# Patient Record
Sex: FEMALE | Race: WHITE | Employment: OTHER | URBAN - METROPOLITAN AREA
[De-identification: names, ages, dates, MRNs, and addresses within clinical notes are randomized per-mention and may not be internally consistent; named-entity substitution may affect disease eponyms.]

---

## 2020-10-08 RX ORDER — ATENOLOL 100 MG/1
TABLET ORAL DAILY
COMMUNITY
End: 2020-10-09

## 2020-10-08 RX ORDER — SODIUM CHLORIDE 9 MG/ML
INJECTION, SOLUTION INTRAVENOUS CONTINUOUS
Status: CANCELLED | OUTPATIENT
Start: 2020-10-14

## 2020-10-08 RX ORDER — HYDROCHLOROTHIAZIDE 50 MG/1
50 TABLET ORAL DAILY
COMMUNITY

## 2020-10-08 RX ORDER — LOSARTAN POTASSIUM 100 MG/1
100 TABLET ORAL DAILY
COMMUNITY

## 2020-10-08 SDOH — HEALTH STABILITY: MENTAL HEALTH: HOW OFTEN DO YOU HAVE A DRINK CONTAINING ALCOHOL?: NEVER

## 2020-10-09 ENCOUNTER — HOSPITAL ENCOUNTER (OUTPATIENT)
Dept: GENERAL RADIOLOGY | Age: 70
Discharge: HOME | End: 2020-10-11
Payer: MEDICARE

## 2020-10-09 ENCOUNTER — HOSPITAL ENCOUNTER (OUTPATIENT)
Dept: PREADMISSION TESTING | Age: 70
Discharge: HOME | End: 2020-10-09
Payer: MEDICARE

## 2020-10-09 ENCOUNTER — HOSPITAL ENCOUNTER (OUTPATIENT)
Age: 70
Discharge: HOME | End: 2020-10-11
Payer: MEDICARE

## 2020-10-09 VITALS
BODY MASS INDEX: 34.63 KG/M2 | HEIGHT: 58 IN | DIASTOLIC BLOOD PRESSURE: 90 MMHG | WEIGHT: 165 LBS | OXYGEN SATURATION: 96 % | HEART RATE: 90 BPM | RESPIRATION RATE: 18 BRPM | TEMPERATURE: 96.9 F | SYSTOLIC BLOOD PRESSURE: 180 MMHG

## 2020-10-09 LAB
ANION GAP SERPL CALCULATED.3IONS-SCNC: 11 MMOL/L (ref 7–16)
BUN BLDV-MCNC: 10 MG/DL (ref 8–23)
CALCIUM SERPL-MCNC: 9.1 MG/DL (ref 8.6–10.2)
CHLORIDE BLD-SCNC: 92 MMOL/L (ref 98–107)
CO2: 29 MMOL/L (ref 22–29)
CREAT SERPL-MCNC: 0.6 MG/DL (ref 0.5–1)
GFR AFRICAN AMERICAN: >60
GFR NON-AFRICAN AMERICAN: >60 ML/MIN/1.73
GLUCOSE BLD-MCNC: 86 MG/DL (ref 74–99)
HCT VFR BLD CALC: 47.2 % (ref 34–48)
HEMOGLOBIN: 15.9 G/DL (ref 11.5–15.5)
MCH RBC QN AUTO: 29.3 PG (ref 26–35)
MCHC RBC AUTO-ENTMCNC: 33.7 % (ref 32–34.5)
MCV RBC AUTO: 86.9 FL (ref 80–99.9)
PDW BLD-RTO: 13.8 FL (ref 11.5–15)
PLATELET # BLD: 354 E9/L (ref 130–450)
PMV BLD AUTO: 8.8 FL (ref 7–12)
POTASSIUM REFLEX MAGNESIUM: 4.3 MMOL/L (ref 3.5–5)
RBC # BLD: 5.43 E12/L (ref 3.5–5.5)
SODIUM BLD-SCNC: 132 MMOL/L (ref 132–146)
WBC # BLD: 10.9 E9/L (ref 4.5–11.5)

## 2020-10-09 PROCEDURE — 93005 ELECTROCARDIOGRAM TRACING: CPT | Performed by: ANESTHESIOLOGY

## 2020-10-09 PROCEDURE — 36415 COLL VENOUS BLD VENIPUNCTURE: CPT

## 2020-10-09 PROCEDURE — U0003 INFECTIOUS AGENT DETECTION BY NUCLEIC ACID (DNA OR RNA); SEVERE ACUTE RESPIRATORY SYNDROME CORONAVIRUS 2 (SARS-COV-2) (CORONAVIRUS DISEASE [COVID-19]), AMPLIFIED PROBE TECHNIQUE, MAKING USE OF HIGH THROUGHPUT TECHNOLOGIES AS DESCRIBED BY CMS-2020-01-R: HCPCS

## 2020-10-09 PROCEDURE — 80048 BASIC METABOLIC PNL TOTAL CA: CPT

## 2020-10-09 PROCEDURE — 85027 COMPLETE CBC AUTOMATED: CPT

## 2020-10-09 PROCEDURE — 71046 X-RAY EXAM CHEST 2 VIEWS: CPT

## 2020-10-09 RX ORDER — VIT C/B6/B5/MAGNESIUM/HERB 173 50-5-6-5MG
CAPSULE ORAL
COMMUNITY

## 2020-10-09 RX ORDER — IBUPROFEN 200 MG
1 CAPSULE ORAL DAILY
COMMUNITY

## 2020-10-09 RX ORDER — CETIRIZINE HYDROCHLORIDE 10 MG/1
10 TABLET ORAL DAILY
COMMUNITY

## 2020-10-09 RX ORDER — ASPIRIN 81 MG/1
81 TABLET, CHEWABLE ORAL DAILY
COMMUNITY

## 2020-10-09 RX ORDER — VITAMIN B COMPLEX
1 CAPSULE ORAL DAILY
COMMUNITY

## 2020-10-09 RX ORDER — ASCORBIC ACID 500 MG
500 TABLET ORAL DAILY
COMMUNITY

## 2020-10-09 RX ORDER — ATORVASTATIN CALCIUM 40 MG/1
40 TABLET, FILM COATED ORAL DAILY
COMMUNITY

## 2020-10-09 RX ORDER — UBIDECARENONE 75 MG
50 CAPSULE ORAL DAILY
COMMUNITY

## 2020-10-09 RX ORDER — MULTIVITAMIN WITH IRON
250 TABLET ORAL DAILY
COMMUNITY

## 2020-10-09 RX ORDER — OMEGA-3S/DHA/EPA/FISH OIL/D3 300MG-1000
400 CAPSULE ORAL DAILY
COMMUNITY

## 2020-10-09 ASSESSMENT — PAIN SCALES - GENERAL: PAINLEVEL_OUTOF10: 3

## 2020-10-09 ASSESSMENT — PAIN DESCRIPTION - ORIENTATION: ORIENTATION: LOWER

## 2020-10-09 ASSESSMENT — PAIN DESCRIPTION - PAIN TYPE: TYPE: CHRONIC PAIN

## 2020-10-09 ASSESSMENT — PAIN DESCRIPTION - DESCRIPTORS: DESCRIPTORS: DISCOMFORT;CONSTANT

## 2020-10-09 ASSESSMENT — PAIN DESCRIPTION - LOCATION: LOCATION: BACK

## 2020-10-09 ASSESSMENT — PAIN DESCRIPTION - FREQUENCY: FREQUENCY: CONTINUOUS

## 2020-10-10 LAB
EKG ATRIAL RATE: 85 BPM
EKG P AXIS: 68 DEGREES
EKG P-R INTERVAL: 162 MS
EKG Q-T INTERVAL: 376 MS
EKG QRS DURATION: 126 MS
EKG QTC CALCULATION (BAZETT): 447 MS
EKG T AXIS: -9 DEGREES
EKG VENTRICULAR RATE: 85 BPM

## 2020-10-11 LAB
SARS-COV-2: NOT DETECTED
SOURCE: NORMAL

## 2020-10-13 NOTE — PROGRESS NOTES
Called dr Ho Walsh office re :ekg, faxed ekg to dr Ho Walsh office, confirmation received.
Preliminary ekg faxed to dr Sofia Peña . .. attempt to call ofice many times in morning- consistently busy. ... able to contact office after lunch . .. they did receive fax . . will show doctor. .. Kyara Rivas 3pm  Did not hear from office yet. . preliminary ekg shown to anesthesia -- barbara escalona --- concerned about ekg  -- called dr Mare Andrade office. .. said dr Sofia Peña cancelling surgery
Reviewed medical history and  Echo form 7/2020. Will do CXR in PAT. No further orders. Pt and Dr Smith Modest office notified that H&P are needed.    Mayur Falling  10/8/2020  1200
valuables (money, credit cards, checkbooks, etc.) Do not wear any makeup (including no eye makeup) or nail polish on your fingers or toes. 11. DO NOT wear any jewelry or piercings on day of surgery. All body piercing jewelry must be removed. 12. Shower the night before surgery with ___Antibacterial soap /MERCED WIPES________    13. TOTAL JOINT REPLACEMENT/HYSTERECTOMY PATIENTS ONLY---Remember to bring Blood Bank bracelet to the hospital on the day of surgery. 14. If you have a Living Will and Durable Power of  for Healthcare, please bring in a copy. 15. If appropriate bring crutches, inspirex, WALKER, CANE etc... 12. Notify your Surgeon if you develop any illness between now and surgery time, cough, cold, fever, sore throat, nausea, vomiting, etc.  Please notify your surgeon if you experience dizziness, shortness of breath or blurred vision between now & the time of your surgery. 17. If you have ___dentures, they will be removed before going to the OR; we will provide you a container. If you wear ___contact lenses or ___glasses, they will be removed; please bring a case for them. 18. To provide excellent care visitors will be limited to 2 in the room at any given time. 19. Please bring picture ID and insurance card. 20. Sleep apnea patients need to bring CPAP AND SETTINGS to hospital on day of surgery. 21. During flu season no children under the age of 15 are permitted in the hospital for the safety of all patients. 22. Other                  Please call AMBULATORY CARE if you have any further questions.    [address]     [address]

## 2020-11-05 ENCOUNTER — HOSPITAL ENCOUNTER (OUTPATIENT)
Age: 70
Discharge: HOME | End: 2020-11-07
Payer: MEDICARE

## 2020-11-05 DIAGNOSIS — Z01.818 PREOP TESTING: ICD-10-CM

## 2020-11-05 LAB
ANION GAP SERPL CALCULATED.3IONS-SCNC: 15 MMOL/L (ref 7–16)
BUN BLDV-MCNC: 9 MG/DL (ref 8–23)
CALCIUM SERPL-MCNC: 9.6 MG/DL (ref 8.6–10.2)
CHLORIDE BLD-SCNC: 92 MMOL/L (ref 98–107)
CO2: 26 MMOL/L (ref 22–29)
CREAT SERPL-MCNC: 0.6 MG/DL (ref 0.5–1)
GFR AFRICAN AMERICAN: >60
GFR NON-AFRICAN AMERICAN: >60 ML/MIN/1.73
GLUCOSE BLD-MCNC: 131 MG/DL (ref 74–99)
HCT VFR BLD CALC: 48.6 % (ref 34–48)
HEMOGLOBIN: 15.8 G/DL (ref 11.5–15.5)
MCH RBC QN AUTO: 28.8 PG (ref 26–35)
MCHC RBC AUTO-ENTMCNC: 32.5 % (ref 32–34.5)
MCV RBC AUTO: 88.5 FL (ref 80–99.9)
PDW BLD-RTO: 14.3 FL (ref 11.5–15)
PLATELET # BLD: 410 E9/L (ref 130–450)
PMV BLD AUTO: 9.9 FL (ref 7–12)
POTASSIUM REFLEX MAGNESIUM: 4.1 MMOL/L (ref 3.5–5)
RBC # BLD: 5.49 E12/L (ref 3.5–5.5)
SODIUM BLD-SCNC: 133 MMOL/L (ref 132–146)
WBC # BLD: 11.2 E9/L (ref 4.5–11.5)

## 2020-11-05 PROCEDURE — U0003 INFECTIOUS AGENT DETECTION BY NUCLEIC ACID (DNA OR RNA); SEVERE ACUTE RESPIRATORY SYNDROME CORONAVIRUS 2 (SARS-COV-2) (CORONAVIRUS DISEASE [COVID-19]), AMPLIFIED PROBE TECHNIQUE, MAKING USE OF HIGH THROUGHPUT TECHNOLOGIES AS DESCRIBED BY CMS-2020-01-R: HCPCS

## 2020-11-06 LAB — SARS-COV-2, PCR: NOT DETECTED

## 2020-11-10 NOTE — PROGRESS NOTES
[address] [facility]                                                                                                                    PRE OP INSTRUCTIONS FOR  aKyleigh Terrazas        Date: 11/10/2020    Date of surgery: 11/11/2020   Arrival Time: Hospital will call you between 5pm and 7pm with your final arrival time for surgery    1. Do not eat or drink anything after 12 prior to surgery. This includes no water, chewing gum, mints or ice chips. 2. Take the following medications with a small sip of water on the morning of Surgery: none     3. Diabetics may take evening dose of insulin but none after midnight. If you feel symptomatic or low blood sugar morning of surgery drink 1-2 ounces of apple juice only. 4. Aspirin, Ibuprofen, Advil, Naproxen, Vitamin E and other Anti-inflammatory products should be stopped  before surgery  as directed by your physician. Take Tylenol only unless instructed otherwise by your surgeon. 5. Check with your Doctor regarding stopping Plavix, Coumadin, Lovenox, Eliquis, Effient, or other blood thinners. 6. Do not smoke,use illicit drugs and do not drink any alcoholic beverages 24 hours prior to surgery. 7. You may brush your teeth the morning of surgery. DO NOT SWALLOW WATER    8. You MUST make arrangements for a responsible adult to take you home after your surgery. You will not be allowed to leave alone or drive yourself home. It is strongly suggested someone stay with you the first 24 hrs. Your surgery will be cancelled if you do not have a ride home. 9. PEDIATRIC PATIENTS ONLY:  A parent/legal guardian must accompany a child scheduled for surgery and plan to stay at the hospital until the child is discharged. Please do not bring other children with you.     10. Please wear simple, loose fitting clothing to the hospital.  Andre Herb not bring valuables (money, credit cards, checkbooks, etc.) Do not wear any makeup (including no eye makeup) or nail

## 2020-11-11 ENCOUNTER — ANESTHESIA EVENT (OUTPATIENT)
Dept: OPERATING ROOM | Age: 70
End: 2020-11-11
Payer: MEDICARE

## 2020-11-11 ENCOUNTER — HOSPITAL ENCOUNTER (OUTPATIENT)
Age: 70
Setting detail: OUTPATIENT SURGERY
Discharge: HOME | End: 2020-11-11
Attending: ORAL & MAXILLOFACIAL SURGERY | Admitting: ORAL & MAXILLOFACIAL SURGERY
Payer: MEDICARE

## 2020-11-11 ENCOUNTER — ANESTHESIA (OUTPATIENT)
Dept: OPERATING ROOM | Age: 70
End: 2020-11-11
Payer: MEDICARE

## 2020-11-11 VITALS
OXYGEN SATURATION: 91 % | DIASTOLIC BLOOD PRESSURE: 80 MMHG | BODY MASS INDEX: 34.43 KG/M2 | HEART RATE: 87 BPM | TEMPERATURE: 97.2 F | WEIGHT: 164 LBS | RESPIRATION RATE: 16 BRPM | HEIGHT: 58 IN | SYSTOLIC BLOOD PRESSURE: 137 MMHG

## 2020-11-11 VITALS
RESPIRATION RATE: 8 BRPM | SYSTOLIC BLOOD PRESSURE: 90 MMHG | DIASTOLIC BLOOD PRESSURE: 68 MMHG | OXYGEN SATURATION: 100 %

## 2020-11-11 LAB — METER GLUCOSE: 165 MG/DL (ref 74–99)

## 2020-11-11 PROCEDURE — 3600000012 HC SURGERY LEVEL 2 ADDTL 15MIN: Performed by: ORAL & MAXILLOFACIAL SURGERY

## 2020-11-11 PROCEDURE — 7100000011 HC PHASE II RECOVERY - ADDTL 15 MIN: Performed by: ORAL & MAXILLOFACIAL SURGERY

## 2020-11-11 PROCEDURE — 2500000003 HC RX 250 WO HCPCS: Performed by: NURSE ANESTHETIST, CERTIFIED REGISTERED

## 2020-11-11 PROCEDURE — 6360000002 HC RX W HCPCS: Performed by: NURSE ANESTHETIST, CERTIFIED REGISTERED

## 2020-11-11 PROCEDURE — 94640 AIRWAY INHALATION TREATMENT: CPT

## 2020-11-11 PROCEDURE — 6370000000 HC RX 637 (ALT 250 FOR IP): Performed by: ANESTHESIOLOGY

## 2020-11-11 PROCEDURE — 2500000003 HC RX 250 WO HCPCS: Performed by: ORAL & MAXILLOFACIAL SURGERY

## 2020-11-11 PROCEDURE — 6360000002 HC RX W HCPCS: Performed by: ORAL & MAXILLOFACIAL SURGERY

## 2020-11-11 PROCEDURE — 3700000001 HC ADD 15 MINUTES (ANESTHESIA): Performed by: ORAL & MAXILLOFACIAL SURGERY

## 2020-11-11 PROCEDURE — 7100000001 HC PACU RECOVERY - ADDTL 15 MIN: Performed by: ORAL & MAXILLOFACIAL SURGERY

## 2020-11-11 PROCEDURE — 82962 GLUCOSE BLOOD TEST: CPT

## 2020-11-11 PROCEDURE — 2700000000 HC OXYGEN THERAPY PER DAY

## 2020-11-11 PROCEDURE — 7100000010 HC PHASE II RECOVERY - FIRST 15 MIN: Performed by: ORAL & MAXILLOFACIAL SURGERY

## 2020-11-11 PROCEDURE — 3600000002 HC SURGERY LEVEL 2 BASE: Performed by: ORAL & MAXILLOFACIAL SURGERY

## 2020-11-11 PROCEDURE — 3700000000 HC ANESTHESIA ATTENDED CARE: Performed by: ORAL & MAXILLOFACIAL SURGERY

## 2020-11-11 PROCEDURE — 2580000003 HC RX 258: Performed by: ANESTHESIOLOGY

## 2020-11-11 PROCEDURE — 2580000003 HC RX 258: Performed by: NURSE ANESTHETIST, CERTIFIED REGISTERED

## 2020-11-11 PROCEDURE — 7100000000 HC PACU RECOVERY - FIRST 15 MIN: Performed by: ORAL & MAXILLOFACIAL SURGERY

## 2020-11-11 RX ORDER — PROMETHAZINE HYDROCHLORIDE 25 MG/ML
6.25 INJECTION, SOLUTION INTRAMUSCULAR; INTRAVENOUS
Status: DISCONTINUED | OUTPATIENT
Start: 2020-11-11 | End: 2020-11-11 | Stop reason: HOSPADM

## 2020-11-11 RX ORDER — SODIUM CHLORIDE 9 MG/ML
INJECTION, SOLUTION INTRAVENOUS CONTINUOUS PRN
Status: DISCONTINUED | OUTPATIENT
Start: 2020-11-11 | End: 2020-11-11 | Stop reason: SDUPTHER

## 2020-11-11 RX ORDER — PROPOFOL 10 MG/ML
INJECTION, EMULSION INTRAVENOUS PRN
Status: DISCONTINUED | OUTPATIENT
Start: 2020-11-11 | End: 2020-11-11 | Stop reason: SDUPTHER

## 2020-11-11 RX ORDER — DEXAMETHASONE SODIUM PHOSPHATE 10 MG/ML
INJECTION, SOLUTION INTRAMUSCULAR; INTRAVENOUS PRN
Status: DISCONTINUED | OUTPATIENT
Start: 2020-11-11 | End: 2020-11-11 | Stop reason: SDUPTHER

## 2020-11-11 RX ORDER — HYDROCODONE BITARTRATE AND ACETAMINOPHEN 5; 325 MG/1; MG/1
1 TABLET ORAL PRN
Status: DISCONTINUED | OUTPATIENT
Start: 2020-11-11 | End: 2020-11-11 | Stop reason: HOSPADM

## 2020-11-11 RX ORDER — GLYCOPYRROLATE 0.2 MG/ML
INJECTION INTRAMUSCULAR; INTRAVENOUS PRN
Status: DISCONTINUED | OUTPATIENT
Start: 2020-11-11 | End: 2020-11-11 | Stop reason: SDUPTHER

## 2020-11-11 RX ORDER — MORPHINE SULFATE 2 MG/ML
2 INJECTION, SOLUTION INTRAMUSCULAR; INTRAVENOUS EVERY 5 MIN PRN
Status: DISCONTINUED | OUTPATIENT
Start: 2020-11-11 | End: 2020-11-11 | Stop reason: HOSPADM

## 2020-11-11 RX ORDER — MEPERIDINE HYDROCHLORIDE 25 MG/ML
12.5 INJECTION INTRAMUSCULAR; INTRAVENOUS; SUBCUTANEOUS EVERY 5 MIN PRN
Status: DISCONTINUED | OUTPATIENT
Start: 2020-11-11 | End: 2020-11-11 | Stop reason: HOSPADM

## 2020-11-11 RX ORDER — FENTANYL CITRATE 50 UG/ML
INJECTION, SOLUTION INTRAMUSCULAR; INTRAVENOUS PRN
Status: DISCONTINUED | OUTPATIENT
Start: 2020-11-11 | End: 2020-11-11 | Stop reason: SDUPTHER

## 2020-11-11 RX ORDER — LIDOCAINE HYDROCHLORIDE 20 MG/ML
INJECTION, SOLUTION INFILTRATION; PERINEURAL PRN
Status: DISCONTINUED | OUTPATIENT
Start: 2020-11-11 | End: 2020-11-11 | Stop reason: SDUPTHER

## 2020-11-11 RX ORDER — SODIUM CHLORIDE 9 MG/ML
INJECTION, SOLUTION INTRAVENOUS CONTINUOUS
Status: DISCONTINUED | OUTPATIENT
Start: 2020-11-11 | End: 2020-11-11 | Stop reason: HOSPADM

## 2020-11-11 RX ORDER — FENTANYL CITRATE 50 UG/ML
25 INJECTION, SOLUTION INTRAMUSCULAR; INTRAVENOUS EVERY 5 MIN PRN
Status: DISCONTINUED | OUTPATIENT
Start: 2020-11-11 | End: 2020-11-11 | Stop reason: HOSPADM

## 2020-11-11 RX ORDER — SODIUM CHLORIDE 0.9 % (FLUSH) 0.9 %
10 SYRINGE (ML) INJECTION PRN
Status: DISCONTINUED | OUTPATIENT
Start: 2020-11-11 | End: 2020-11-11 | Stop reason: HOSPADM

## 2020-11-11 RX ORDER — ROCURONIUM BROMIDE 10 MG/ML
INJECTION, SOLUTION INTRAVENOUS PRN
Status: DISCONTINUED | OUTPATIENT
Start: 2020-11-11 | End: 2020-11-11 | Stop reason: SDUPTHER

## 2020-11-11 RX ORDER — HYDROCODONE BITARTRATE AND ACETAMINOPHEN 5; 325 MG/1; MG/1
2 TABLET ORAL PRN
Status: DISCONTINUED | OUTPATIENT
Start: 2020-11-11 | End: 2020-11-11 | Stop reason: HOSPADM

## 2020-11-11 RX ORDER — IPRATROPIUM BROMIDE AND ALBUTEROL SULFATE 2.5; .5 MG/3ML; MG/3ML
1 SOLUTION RESPIRATORY (INHALATION) ONCE
Status: COMPLETED | OUTPATIENT
Start: 2020-11-11 | End: 2020-11-11

## 2020-11-11 RX ORDER — SODIUM CHLORIDE 0.9 % (FLUSH) 0.9 %
10 SYRINGE (ML) INJECTION EVERY 12 HOURS SCHEDULED
Status: DISCONTINUED | OUTPATIENT
Start: 2020-11-11 | End: 2020-11-11 | Stop reason: HOSPADM

## 2020-11-11 RX ORDER — LIDOCAINE HYDROCHLORIDE AND EPINEPHRINE 10; 10 MG/ML; UG/ML
INJECTION, SOLUTION INFILTRATION; PERINEURAL PRN
Status: DISCONTINUED | OUTPATIENT
Start: 2020-11-11 | End: 2020-11-11 | Stop reason: ALTCHOICE

## 2020-11-11 RX ORDER — ONDANSETRON 2 MG/ML
INJECTION INTRAMUSCULAR; INTRAVENOUS PRN
Status: DISCONTINUED | OUTPATIENT
Start: 2020-11-11 | End: 2020-11-11 | Stop reason: SDUPTHER

## 2020-11-11 RX ADMIN — PROPOFOL 150 MG: 10 INJECTION, EMULSION INTRAVENOUS at 09:27

## 2020-11-11 RX ADMIN — IPRATROPIUM BROMIDE AND ALBUTEROL SULFATE 1 AMPULE: .5; 3 SOLUTION RESPIRATORY (INHALATION) at 10:52

## 2020-11-11 RX ADMIN — ROCURONIUM BROMIDE 40 MG: 10 INJECTION, SOLUTION INTRAVENOUS at 09:26

## 2020-11-11 RX ADMIN — Medication 2 G: at 09:18

## 2020-11-11 RX ADMIN — SODIUM CHLORIDE: 9 INJECTION, SOLUTION INTRAVENOUS at 08:08

## 2020-11-11 RX ADMIN — LIDOCAINE HYDROCHLORIDE 50 MG: 20 INJECTION, SOLUTION INFILTRATION; PERINEURAL at 09:27

## 2020-11-11 RX ADMIN — FENTANYL CITRATE 100 MCG: 50 INJECTION, SOLUTION INTRAMUSCULAR; INTRAVENOUS at 09:18

## 2020-11-11 RX ADMIN — ONDANSETRON 4 MG: 2 INJECTION INTRAMUSCULAR; INTRAVENOUS at 09:39

## 2020-11-11 RX ADMIN — NEOSTIGMINE METHYLSULFATE 3 MG: 1 INJECTION INTRAMUSCULAR; INTRAVENOUS; SUBCUTANEOUS at 09:55

## 2020-11-11 RX ADMIN — PHENYLEPHRINE HYDROCHLORIDE 100 MCG: 10 INJECTION INTRAVENOUS at 09:28

## 2020-11-11 RX ADMIN — GLYCOPYRROLATE 0.6 MG: 0.2 INJECTION, SOLUTION INTRAMUSCULAR; INTRAVENOUS at 09:55

## 2020-11-11 RX ADMIN — SODIUM CHLORIDE: 9 INJECTION, SOLUTION INTRAVENOUS at 08:30

## 2020-11-11 RX ADMIN — DEXAMETHASONE SODIUM PHOSPHATE 10 MG: 10 INJECTION, SOLUTION INTRAMUSCULAR; INTRAVENOUS at 09:39

## 2020-11-11 RX ADMIN — PHENYLEPHRINE HYDROCHLORIDE 100 MCG: 10 INJECTION INTRAVENOUS at 09:31

## 2020-11-11 ASSESSMENT — PULMONARY FUNCTION TESTS
PIF_VALUE: 19
PIF_VALUE: 19
PIF_VALUE: 17
PIF_VALUE: 19
PIF_VALUE: 15
PIF_VALUE: 19
PIF_VALUE: 29
PIF_VALUE: 15
PIF_VALUE: 24
PIF_VALUE: 18
PIF_VALUE: 1
PIF_VALUE: 18
PIF_VALUE: 19
PIF_VALUE: 0
PIF_VALUE: 2
PIF_VALUE: 19
PIF_VALUE: 18
PIF_VALUE: 17
PIF_VALUE: 15
PIF_VALUE: 19
PIF_VALUE: 14
PIF_VALUE: 19
PIF_VALUE: 0
PIF_VALUE: 18
PIF_VALUE: 19
PIF_VALUE: 19
PIF_VALUE: 0
PIF_VALUE: 2
PIF_VALUE: 2
PIF_VALUE: 19
PIF_VALUE: 0
PIF_VALUE: 16
PIF_VALUE: 20
PIF_VALUE: 0
PIF_VALUE: 15
PIF_VALUE: 19

## 2020-11-11 ASSESSMENT — PAIN DESCRIPTION - PAIN TYPE
TYPE: SURGICAL PAIN
TYPE: SURGICAL PAIN

## 2020-11-11 ASSESSMENT — PAIN DESCRIPTION - DESCRIPTORS
DESCRIPTORS: DISCOMFORT;SORE
DESCRIPTORS: DISCOMFORT;ACHING

## 2020-11-11 ASSESSMENT — PAIN DESCRIPTION - FREQUENCY: FREQUENCY: CONTINUOUS

## 2020-11-11 ASSESSMENT — PAIN DESCRIPTION - ONSET: ONSET: ON-GOING

## 2020-11-11 ASSESSMENT — PAIN DESCRIPTION - LOCATION
LOCATION: MOUTH
LOCATION: MOUTH

## 2020-11-11 ASSESSMENT — PAIN SCALES - GENERAL
PAINLEVEL_OUTOF10: 2
PAINLEVEL_OUTOF10: 0
PAINLEVEL_OUTOF10: 2

## 2020-11-11 ASSESSMENT — LIFESTYLE VARIABLES: SMOKING_STATUS: 1

## 2020-11-11 ASSESSMENT — PAIN - FUNCTIONAL ASSESSMENT: PAIN_FUNCTIONAL_ASSESSMENT: 0-10

## 2020-11-11 NOTE — ANESTHESIA PRE PROCEDURE
Department of Anesthesiology  Preprocedure Note       Name:  Marlyn Mckeon   Age:  65 y.o.  :  1950                                          MRN:  [id number]         Date:  2020      Surgeon: Kandy Carlson):  Negrita Barrera DDS    Procedure: Procedure(s):  EXTRACTION OF REMAINING SEPTIC TEETH    Medications prior to admission:   Prior to Admission medications    Medication Sig Start Date End Date Taking?  Authorizing Provider   Omega-3 Fatty Acids (OMEGA-3 EPA FISH OIL PO) Take by mouth   Yes Historical Provider, MD   atorvastatin (LIPITOR) 40 MG tablet Take 40 mg by mouth daily   Yes Historical Provider, MD   Ferrous Sulfate (IRON PO) Take 45 mg by mouth 3 times a week   Yes Historical Provider, MD   magnesium (MAGNESIUM-OXIDE) 250 MG TABS tablet Take 250 mg by mouth daily   Yes Historical Provider, MD   vitamin C (ASCORBIC ACID) 500 MG tablet Take 500 mg by mouth daily   Yes Historical Provider, MD   vitamin D3 (CHOLECALCIFEROL) 10 MCG (400 UNIT) TABS tablet Take 400 Units by mouth daily   Yes Historical Provider, MD   Misc Natural Products (Winda Rist COMPLEX/MSM PO) Take by mouth   Yes Historical Provider, MD   Turmeric 500 MG CAPS Take by mouth   Yes Historical Provider, MD   aspirin 81 MG chewable tablet Take 81 mg by mouth daily   Yes Historical Provider, MD   cetirizine (ZYRTEC) 10 MG tablet Take 10 mg by mouth daily   Yes Historical Provider, MD   b complex vitamins capsule Take 1 capsule by mouth daily   Yes Historical Provider, MD   vitamin B-12 (CYANOCOBALAMIN) 100 MCG tablet Take 50 mcg by mouth daily   Yes Historical Provider, MD   calcium carbonate (OYSTER SHELL CALCIUM 500 MG) 1250 (500 Ca) MG tablet Take 1 tablet by mouth daily   Yes Historical Provider, MD   losartan (COZAAR) 100 MG tablet Take 100 mg by mouth daily   Yes Historical Provider, MD   Lansoprazole (PREVACID PO) Take by mouth   Yes Historical Provider, MD   hydroCHLOROthiazide (HYDRODIURIL) 50 MG tablet Take 50 mg by mouth daily   Yes Historical Provider, MD   metFORMIN (GLUCOPHAGE) 500 MG tablet Take 500 mg by mouth 2 times daily (with meals) 2 tabs in am, 1 tab with supper   Yes Historical Provider, MD       Current medications:    Current Facility-Administered Medications   Medication Dose Route Frequency Provider Last Rate Last Dose    sodium chloride flush 0.9 % injection 10 mL  10 mL Intravenous 2 times per day Loren Estrella, DDS        sodium chloride flush 0.9 % injection 10 mL  10 mL Intravenous PRN Loren Estrella, DDS        ceFAZolin (ANCEF) 2 g in sterile water 20 mL IV syringe  2 g Intravenous On Call to OR Lorenijeoma De La Rosa, DDS        0.9 % sodium chloride infusion   Intravenous Continuous Kyree Agrawal  mL/hr at 11/11/20 0808         Allergies: Allergies   Allergen Reactions    Cortisone     Novocain [Procaine]     Other      Pollens, an antibiotic -does not know name of     Tylenol [Acetaminophen]      Can take regular tylenol but not extra strengh       Problem List:  There is no problem list on file for this patient.       Past Medical History:        Diagnosis Date    Arthritis     [facility] [address]     Diabetes mellitus [facility] [address]     Fibromyalgia     Heart valve disorder     Hypertension        Past Surgical History:        Procedure Laterality Date    APPENDECTOMY      CHOLECYSTECTOMY      HERNIA REPAIR      LAPAROSCOPY         Social History:    Social History     Tobacco Use    Smoking status: Current Every Day Smoker     Packs/day: 2.00    Smokeless tobacco: Never Used   Substance Use Topics    Alcohol use: Not Currently     Frequency: Never                                Ready to quit: Not Answered  Counseling given: Not Answered      Vital Signs (Current):   Vitals:    11/10/20 1001 11/11/20 0747   BP:  130/89   Pulse:  (!) 5   Resp:  20   Temp:  97.7 °F (36.5 °C)   TempSrc:  Temporal   SpO2:  92%   Weight: 160 lb (72.6 kg) 164 lb (74.4 kg)   Height: 4' 10\" (1.473 m) 4' 10\" (1.473 m)                                              BP Readings from Last 3 Encounters:   11/11/20 130/89   10/09/20 (!) 180/90       NPO Status:                                                                                 BMI:   Wt Readings from Last 3 Encounters:   11/11/20 164 lb (74.4 kg)   10/09/20 165 lb (74.8 kg)     Body mass index is 34.28 kg/m². CBC:   Lab Results   Component Value Date    WBC 11.2 11/05/2020    RBC 5.49 11/05/2020    HGB 15.8 11/05/2020    HCT 48.6 11/05/2020    MCV 88.5 11/05/2020    RDW 14.3 11/05/2020     11/05/2020       CMP:   Lab Results   Component Value Date     11/05/2020    K 4.1 11/05/2020    CL 92 11/05/2020    CO2 26 11/05/2020    BUN 9 11/05/2020    CREATININE 0.6 11/05/2020    GFRAA >60 11/05/2020    LABGLOM >60 11/05/2020    GLUCOSE 131 11/05/2020    CALCIUM 9.6 11/05/2020       POC Tests: No results for input(s): POCGLU, POCNA, POCK, POCCL, POCBUN, POCHEMO, POCHCT in the last 72 hours.     Coags: No results found for: PROTIME, INR, APTT    HCG (If Applicable): No results found for: PREGTESTUR, PREGSERUM, HCG, HCGQUANT     ABGs: No results found for: PHART, PO2ART, WOK4OZL, LXV2IQO, BEART, A6CJXUHX     Type & Screen (If Applicable):  No results found for: LABABO, LABRH    Drug/Infectious Status (If Applicable):  No results found for: HIV, HEPCAB    COVID-19 Screening (If Applicable):   Lab Results   Component Value Date    COVID19 Not Detected 11/05/2020         Anesthesia Evaluation  Patient summary reviewed no history of anesthetic complications:   Airway: Mallampati: II        Dental:      Comment: Poor repair    Pulmonary: breath sounds clear to auscultation  (+) COPD:  current smoker                           Cardiovascular:    (+) hypertension:, hyperlipidemia        Rhythm: regular  Rate: normal                    Neuro/Psych:   (+) neuromuscular disease (fibromyalgia):,             GI/Hepatic/Renal: Neg GI/Hepatic/Renal ROS       (-) no morbid obesity       Endo/Other:    (+) Diabetes, . Abdominal:   (+) obese,         Vascular: negative vascular ROS. Anesthesia Plan      general     ASA 3       Induction: intravenous. MIPS: Postoperative opioids intended and Prophylactic antiemetics administered. Anesthetic plan and risks discussed with patient. Plan discussed with CRNA. DOS STAFF ADDENDUM:    Pt seen and examined, chart reviewed (including anesthesia, drug and allergy history). Anesthetic plan, risks, benefits, alternatives, and personnel involved discussed with patient. Patient verbalized an understanding and agrees to proceed. Plan discussed with care team members and agreed upon.     Maximilian Wright MD  Staff Anesthesiologist  8:56 AM        Maximilian Wright MD   11/11/2020

## 2020-11-11 NOTE — OP NOTE
removed. The  patient was awoke, extubated and transported to the recovery room in  stable condition.         Michele Richardson    D: 11/11/2020 10:29:51       T: 11/11/2020 11:44:40     [email or web address]  Job#: [id number]     Doc#: [id number]    CC:

## 2020-11-11 NOTE — BRIEF OP NOTE
Brief Postoperative Note      Patient: Domingo Magaña  YOB: 1950  MRN: [id number]    Date of Procedure: 11/11/2020    Pre-Op Diagnosis: SEPTIC TEETH    Post-Op Diagnosis: Same       Procedure(s):  EXTRACTION OF REMAINING SEPTIC TEETH    Surgeon(s):  Catrachita Ott DDS    Assistant:  * No surgical staff found *    Anesthesia: General    Estimated Blood Loss (mL): Minimal    Complications: None    Specimens:   * No specimens in log *    Implants:  * No implants in log *      Drains: * No LDAs found *    Findings: multiple septic teeth    Electronically signed by Catrachita Ott DDS on 11/11/2020 at 10:05 AM

## 2023-04-04 DIAGNOSIS — E78.5 HYPERLIPIDEMIA, UNSPECIFIED HYPERLIPIDEMIA TYPE: Primary | ICD-10-CM

## 2023-04-07 RX ORDER — ATORVASTATIN CALCIUM 40 MG/1
TABLET, FILM COATED ORAL
Qty: 90 TABLET | Refills: 3 | Status: SHIPPED | OUTPATIENT
Start: 2023-04-07 | End: 2023-04-12 | Stop reason: SDUPTHER

## 2023-04-12 DIAGNOSIS — E78.5 HYPERLIPIDEMIA, UNSPECIFIED HYPERLIPIDEMIA TYPE: ICD-10-CM

## 2023-04-17 RX ORDER — ATORVASTATIN CALCIUM 40 MG/1
40 TABLET, FILM COATED ORAL NIGHTLY
Qty: 90 TABLET | Refills: 3 | Status: SHIPPED | OUTPATIENT
Start: 2023-04-17 | End: 2024-03-25 | Stop reason: SDUPTHER

## 2023-05-01 DIAGNOSIS — K21.9 GASTROESOPHAGEAL REFLUX DISEASE, UNSPECIFIED WHETHER ESOPHAGITIS PRESENT: Primary | ICD-10-CM

## 2023-05-01 RX ORDER — LANSOPRAZOLE 30 MG/1
30 CAPSULE, DELAYED RELEASE ORAL
Qty: 30 CAPSULE | Refills: 1 | Status: SHIPPED | OUTPATIENT
Start: 2023-05-01 | End: 2023-05-04 | Stop reason: SDUPTHER

## 2023-05-04 ENCOUNTER — OFFICE VISIT (OUTPATIENT)
Dept: PRIMARY CARE | Facility: CLINIC | Age: 73
End: 2023-05-04
Payer: MEDICARE

## 2023-05-04 VITALS
RESPIRATION RATE: 18 BRPM | WEIGHT: 164 LBS | DIASTOLIC BLOOD PRESSURE: 86 MMHG | SYSTOLIC BLOOD PRESSURE: 128 MMHG | TEMPERATURE: 97.2 F | BODY MASS INDEX: 30.96 KG/M2 | OXYGEN SATURATION: 95 % | HEIGHT: 61 IN | HEART RATE: 93 BPM

## 2023-05-04 DIAGNOSIS — D75.1 POLYCYTHEMIA: ICD-10-CM

## 2023-05-04 DIAGNOSIS — E87.1 HYPONATREMIA: ICD-10-CM

## 2023-05-04 DIAGNOSIS — I10 PRIMARY HYPERTENSION: ICD-10-CM

## 2023-05-04 DIAGNOSIS — E78.5 HYPERLIPIDEMIA, UNSPECIFIED HYPERLIPIDEMIA TYPE: ICD-10-CM

## 2023-05-04 DIAGNOSIS — J30.89 ALLERGIC RHINITIS DUE TO OTHER ALLERGIC TRIGGER, UNSPECIFIED SEASONALITY: ICD-10-CM

## 2023-05-04 DIAGNOSIS — E11.49 CONTROLLED TYPE 2 DIABETES MELLITUS WITH OTHER NEUROLOGIC COMPLICATION, WITHOUT LONG-TERM CURRENT USE OF INSULIN (MULTI): ICD-10-CM

## 2023-05-04 DIAGNOSIS — F17.209 NICOTINE DEPENDENCE WITH NICOTINE-INDUCED DISORDER, UNSPECIFIED NICOTINE PRODUCT TYPE: ICD-10-CM

## 2023-05-04 DIAGNOSIS — J44.9 CHRONIC OBSTRUCTIVE PULMONARY DISEASE, UNSPECIFIED COPD TYPE (MULTI): ICD-10-CM

## 2023-05-04 DIAGNOSIS — K21.9 GASTROESOPHAGEAL REFLUX DISEASE, UNSPECIFIED WHETHER ESOPHAGITIS PRESENT: Primary | ICD-10-CM

## 2023-05-04 DIAGNOSIS — E61.1 IRON DEFICIENCY: ICD-10-CM

## 2023-05-04 DIAGNOSIS — I35.0 NONRHEUMATIC AORTIC VALVE STENOSIS: ICD-10-CM

## 2023-05-04 DIAGNOSIS — Z00.00 HEALTH CARE MAINTENANCE: ICD-10-CM

## 2023-05-04 PROBLEM — R60.0 BILATERAL LEG EDEMA: Status: ACTIVE | Noted: 2023-05-04

## 2023-05-04 PROBLEM — I25.10 CORONARY ARTERY CALCIFICATION SEEN ON CAT SCAN: Status: ACTIVE | Noted: 2023-05-04

## 2023-05-04 PROBLEM — R10.32 ABDOMINAL PAIN, ACUTE, LEFT LOWER QUADRANT: Status: RESOLVED | Noted: 2023-05-04 | Resolved: 2023-05-04

## 2023-05-04 PROBLEM — M25.422 EFFUSION OF LEFT ELBOW: Status: RESOLVED | Noted: 2023-05-04 | Resolved: 2023-05-04

## 2023-05-04 PROBLEM — J30.9 ALLERGIC RHINITIS: Status: ACTIVE | Noted: 2023-05-04

## 2023-05-04 PROBLEM — I73.9 CLAUDICATION (CMS-HCC): Status: ACTIVE | Noted: 2023-05-04

## 2023-05-04 PROBLEM — M25.552 LEFT HIP PAIN: Status: RESOLVED | Noted: 2023-05-04 | Resolved: 2023-05-04

## 2023-05-04 PROBLEM — M25.522 LEFT ELBOW PAIN: Status: RESOLVED | Noted: 2023-05-04 | Resolved: 2023-05-04

## 2023-05-04 PROBLEM — M15.9 GENERALIZED OSTEOARTHRITIS OF MULTIPLE SITES: Status: ACTIVE | Noted: 2023-05-04

## 2023-05-04 PROBLEM — R10.84 ABDOMINAL DISCOMFORT, GENERALIZED: Status: RESOLVED | Noted: 2023-05-04 | Resolved: 2023-05-04

## 2023-05-04 PROBLEM — G47.33 MODERATE OBSTRUCTIVE SLEEP APNEA: Status: ACTIVE | Noted: 2023-05-04

## 2023-05-04 PROBLEM — R10.9 ABDOMINAL PAIN: Status: RESOLVED | Noted: 2023-05-04 | Resolved: 2023-05-04

## 2023-05-04 PROBLEM — M79.7 FIBROMYALGIA: Status: ACTIVE | Noted: 2023-05-04

## 2023-05-04 PROBLEM — F41.9 ANXIETY: Status: RESOLVED | Noted: 2023-05-04 | Resolved: 2023-05-04

## 2023-05-04 PROBLEM — E11.9 TYPE 2 DIABETES MELLITUS WITHOUT COMPLICATION, WITHOUT LONG-TERM CURRENT USE OF INSULIN (MULTI): Status: ACTIVE | Noted: 2023-05-04

## 2023-05-04 PROBLEM — F17.200 NICOTINE DEPENDENCE: Status: ACTIVE | Noted: 2023-05-04

## 2023-05-04 PROBLEM — D72.829 LEUKOCYTOSIS: Status: RESOLVED | Noted: 2023-05-04 | Resolved: 2023-05-04

## 2023-05-04 PROBLEM — R94.31 T WAVE INVERSION IN EKG: Status: RESOLVED | Noted: 2023-05-04 | Resolved: 2023-05-04

## 2023-05-04 PROBLEM — G62.9 PERIPHERAL NEUROPATHY: Status: ACTIVE | Noted: 2023-05-04

## 2023-05-04 PROBLEM — L30.9 ECZEMA: Status: ACTIVE | Noted: 2023-05-04

## 2023-05-04 PROBLEM — I73.9 CLAUDICATION (CMS-HCC): Status: RESOLVED | Noted: 2023-05-04 | Resolved: 2023-05-04

## 2023-05-04 PROBLEM — H10.45 CHRONIC ALLERGIC CONJUNCTIVITIS: Status: ACTIVE | Noted: 2023-05-04

## 2023-05-04 PROBLEM — K04.7 DENTAL INFECTION: Status: RESOLVED | Noted: 2023-05-04 | Resolved: 2023-05-04

## 2023-05-04 PROBLEM — K44.9 HIATAL HERNIA: Status: ACTIVE | Noted: 2023-05-04

## 2023-05-04 PROBLEM — K58.0 IRRITABLE BOWEL SYNDROME WITH DIARRHEA: Status: ACTIVE | Noted: 2023-05-04

## 2023-05-04 PROBLEM — D58.2 ELEVATED HEMOGLOBIN (CMS-HCC): Status: ACTIVE | Noted: 2023-05-04

## 2023-05-04 PROBLEM — I45.10 RIGHT BUNDLE BRANCH BLOCK (RBBB) ON ELECTROCARDIOGRAPHY: Status: ACTIVE | Noted: 2023-05-04

## 2023-05-04 PROBLEM — M62.838 TRAPEZIUS MUSCLE SPASM: Status: RESOLVED | Noted: 2023-05-04 | Resolved: 2023-05-04

## 2023-05-04 PROBLEM — F41.8 DEPRESSION WITH ANXIETY: Status: ACTIVE | Noted: 2023-05-04

## 2023-05-04 PROBLEM — L72.3 SEBACEOUS CYST: Status: RESOLVED | Noted: 2023-05-04 | Resolved: 2023-05-04

## 2023-05-04 PROBLEM — F41.9 ANXIETY: Status: ACTIVE | Noted: 2023-05-04

## 2023-05-04 PROCEDURE — 80061 LIPID PANEL: CPT

## 2023-05-04 PROCEDURE — 1160F RVW MEDS BY RX/DR IN RCRD: CPT | Performed by: INTERNAL MEDICINE

## 2023-05-04 PROCEDURE — 3044F HG A1C LEVEL LT 7.0%: CPT | Performed by: INTERNAL MEDICINE

## 2023-05-04 PROCEDURE — 83036 HEMOGLOBIN GLYCOSYLATED A1C: CPT

## 2023-05-04 PROCEDURE — 85025 COMPLETE CBC W/AUTO DIFF WBC: CPT

## 2023-05-04 PROCEDURE — 4010F ACE/ARB THERAPY RXD/TAKEN: CPT | Performed by: INTERNAL MEDICINE

## 2023-05-04 PROCEDURE — 80048 BASIC METABOLIC PNL TOTAL CA: CPT

## 2023-05-04 PROCEDURE — 1159F MED LIST DOCD IN RCRD: CPT | Performed by: INTERNAL MEDICINE

## 2023-05-04 PROCEDURE — 82668 ASSAY OF ERYTHROPOIETIN: CPT

## 2023-05-04 PROCEDURE — 3079F DIAST BP 80-89 MM HG: CPT | Performed by: INTERNAL MEDICINE

## 2023-05-04 PROCEDURE — 3074F SYST BP LT 130 MM HG: CPT | Performed by: INTERNAL MEDICINE

## 2023-05-04 PROCEDURE — 99214 OFFICE O/P EST MOD 30 MIN: CPT | Performed by: INTERNAL MEDICINE

## 2023-05-04 RX ORDER — FLUTICASONE PROPIONATE 50 MCG
1-2 SPRAY, SUSPENSION (ML) NASAL DAILY
COMMUNITY
End: 2024-01-14

## 2023-05-04 RX ORDER — EZETIMIBE 10 MG/1
TABLET ORAL
COMMUNITY
Start: 2007-02-28 | End: 2023-05-04 | Stop reason: ALTCHOICE

## 2023-05-04 RX ORDER — NAPROXEN SODIUM 220 MG/1
81 TABLET, FILM COATED ORAL DAILY
COMMUNITY

## 2023-05-04 RX ORDER — METFORMIN HYDROCHLORIDE 500 MG/1
TABLET, EXTENDED RELEASE ORAL
Qty: 270 TABLET | Refills: 3 | Status: SHIPPED | OUTPATIENT
Start: 2023-05-04 | End: 2024-03-25 | Stop reason: SDUPTHER

## 2023-05-04 RX ORDER — LOSARTAN POTASSIUM 100 MG/1
1 TABLET ORAL DAILY
COMMUNITY
Start: 2020-09-21 | End: 2023-12-10

## 2023-05-04 RX ORDER — LANSOPRAZOLE 30 MG/1
30 CAPSULE, DELAYED RELEASE ORAL
Qty: 90 CAPSULE | Refills: 3 | Status: SHIPPED | OUTPATIENT
Start: 2023-05-04 | End: 2024-01-21

## 2023-05-04 RX ORDER — AMLODIPINE BESYLATE 10 MG/1
1 TABLET ORAL DAILY
COMMUNITY
Start: 2020-10-13 | End: 2023-07-12 | Stop reason: SDUPTHER

## 2023-05-04 RX ORDER — FLUTICASONE PROPIONATE AND SALMETEROL 50; 250 UG/1; UG/1
1 POWDER RESPIRATORY (INHALATION) 2 TIMES DAILY
COMMUNITY
Start: 2022-11-15 | End: 2023-05-04

## 2023-05-04 RX ORDER — HYDROCHLOROTHIAZIDE 50 MG/1
1 TABLET ORAL DAILY
COMMUNITY
Start: 2020-09-21 | End: 2024-01-14

## 2023-05-04 RX ORDER — CYANOCOBALAMIN (VITAMIN B-12) 500 MCG
400 TABLET ORAL DAILY
COMMUNITY

## 2023-05-04 RX ORDER — METFORMIN HYDROCHLORIDE 500 MG/1
2 TABLET, EXTENDED RELEASE ORAL 2 TIMES DAILY
COMMUNITY
Start: 2022-05-12 | End: 2023-05-04 | Stop reason: SDUPTHER

## 2023-05-04 NOTE — ASSESSMENT & PLAN NOTE
- FEV1 52-54% based on PFT 7/2020 (GOLD 2)  - c/w pro air   - patient is technically group B for GOLD, low risk of exacerbation but high symptomatic, technically treatment is etiher LABA or LAMA, however, have trial of prescribed either LABA or LAMA in the past, patient didn't  any of it because of the cost, s/p trial of Breztri and Stiolto, Trelegy works but expensive  - will observe for now, can consider other controller at next visit

## 2023-05-04 NOTE — ASSESSMENT & PLAN NOTE
- low sats but otherwise iron level is good  - have improved  - c/w iron supplements  Lab Results   Component Value Date    FERRITIN 29 02/01/2023     Lab Results   Component Value Date    IRON 89 02/01/2023    TIBC 392 02/01/2023    FERRITIN 29 02/01/2023

## 2023-05-04 NOTE — ASSESSMENT & PLAN NOTE
>>ASSESSMENT AND PLAN FOR ELEVATED HEMOGLOBIN (CMS/HCC) WRITTEN ON 5/4/2023  7:34 PM BY CHEMA IRELAND MD    - patient have elevated Hgb, mild but stable, it is possible that this is reactive given her epo is elevated/normal  - negative JAK2 mutation  - have JAKOB based on sleep studies - however declined to be on CPAP right now - doesn't want to drive far to get it, and also declined pulm referral  - seen heme/onc, and received phlebotomy once, unsure how it would help if the polycythemia is secondary to body's own stimulation, as mild polycythemia is clinically insignificant  - f/u CBC as above  - will repeat epo as below

## 2023-05-04 NOTE — ASSESSMENT & PLAN NOTE
- mild as stated on last echo 3/2022  - advised to follow up with cardiologist   - will repeat in 2 years given mild

## 2023-05-04 NOTE — ASSESSMENT & PLAN NOTE
- c/w metformin 1000 mg qAM and 500 mg qPM  - f/u A1C   - UACR noted negative 2/1/2023  - unremarkable monofilament test on 11/14/22  - advised to see opthalmology yearly

## 2023-05-04 NOTE — PROGRESS NOTES
Subjective   Patient ID:   Aisha Alcantar is a 72 y.o. female, history of fibromyalgia, COPD, DM II, HLD, current smoker, iron deficiency, GERD,  who presents for Follow-up (3month )    COPD  - last PFT 7/2020, moderate COPD  - on PRN proair, 1-2x a month  - s/p trial of Stiolto andBresebtri   - Trelegy was helpful however unable to afford    mild aortic stenosis   - last echo in 3/2022, noted mild AS  - seen cardiology on 9/2022, normal stress test on 10/2022    DM II  - on metformin 1000 mg qAm and 500 mg qPM  - last A1C was 6.5% on 8/2022    HTN  - on losartan 100 mg qd  - on HCTZ 50mg qd  - on amlodipine 10 mg qd     HLD  - on atorvastatin 40 mg qd     Allergic rhinitis  - cetirizine 10 mg qd    GERD  - lansoprazole 30 mg qd     Iron Deficiency  - take ferrous sulfate 3 x a week  - last saturations was 17% on 8/2022    Current smoker  - tried patch, hypnosis, medication, none works    Back pain  - have it for years, no trauma  - patient with herniated disc, patient also with herniated disc in the neck, and also hip pain, occasionally patient knees goes out  - doesn't always take Tylenol, take 650 mg up to BID and Advil  - no new symptoms in bowel/bladder incontinence, patient occasionally have urinary incontinence, and occasionally have bowel incontinence  - numbness on bilateral feet and occasional hands, no saddle anesthesia     Osteopenia  - on calcium 600 mg BID  - on vitamin D 5000 unit once daily     Polycythemia  - seen heme/onc on 1/2022  - noted likely secondary cause due to smoking  - s/p phlebotomy x1, felt worse after, last seen heme onc was 7/2022  - epo normal , likely secondary to tobacco use     All other (10) organ systems have been reviewed, negative for significant complaint and no change from baseline other than mentioned as per HPI above.    Patient Active Problem List   Diagnosis    Hiatal hernia    Polycythemia    Benign essential hypertension    Hyperlipidemia    Allergic rhinitis     Aortic stenosis    Bilateral leg edema    Chronic allergic conjunctivitis    Chronic obstructive pulmonary disease (CMS/HCC)    Controlled diabetes mellitus with neurologic complication (CMS/HCC)    Coronary artery calcification seen on CAT scan    Depression with anxiety    Eczema    Fibromyalgia    Generalized osteoarthritis of multiple sites    Iron deficiency    Irritable bowel syndrome with diarrhea    Moderate obstructive sleep apnea    Nicotine dependence    Peripheral neuropathy    Right bundle branch block (RBBB) on electrocardiography    Health care maintenance    Elevated hemoglobin (CMS/HCC)        Past Surgical History:   Procedure Laterality Date    APPENDECTOMY  05/20/2013    Appendectomy    CHOLECYSTECTOMY  05/20/2013    Cholecystectomy    GALLBLADDER SURGERY  09/02/2015    Gallbladder Surgery    HERNIA REPAIR  09/02/2015    Hernia Repair    OTHER SURGICAL HISTORY  05/11/2020    Oral surgery       No family history on file.    FHx of MS and parkinsons     Social History    reports that she has been smoking cigarettes. She has never used smokeless tobacco. She reports that she does not currently use alcohol. She reports that she does not currently use drugs.  Smoke down to 2 packs a day , live with ,  for 45 years, more than 2 packs a day, started for 20 years, occasional etoh, no illicit drugs, patient have 5 kids,   Allergies   Allergen Reactions    Acetaminophen Unknown     Can take regular tylenol but not extra strengh    Cortisone Unknown     worsens whatever symptom she has    Lidocaine Unknown     She states that anesthetic increases her sensitivity to pain    Naproxen Sodium Unknown    Procaine Unknown       Medication Documentation Review Audit       Reviewed by Antonia Machado MD (Physician) on 05/04/23 at 1930      Medication Order Taking? Sig Documenting Provider Last Dose Status   Advair Diskus 250-50 mcg/dose diskus inhaler [id number] Yes Inhale 1 puff 2 times a day.  "Keturah Vázquez MD Taking Active   amLODIPine (Norvasc) 10 mg tablet [id number] Yes Take 1 tablet (10 mg) by mouth once daily. Keturah Vázquez MD Taking Active   aspirin 81 mg chewable tablet [id number] Yes Chew 1 tablet (81 mg) once daily. Keturah Vázquez MD Taking Active   atorvastatin (Lipitor) 40 mg tablet [id number] Yes Take 1 tablet (40 mg) by mouth once daily at bedtime. Antonia Machado MD Taking Active   cholecalciferol (Vitamin D-3) 10 MCG (400 UNIT) tablet [id number] Yes Take 1 tablet (400 Units) by mouth once daily. Keturah Vázquez MD Taking Active   ezetimibe (Zetia) 10 mg tablet [id number] Yes Take by mouth. Keturah Vázquez MD Taking Active   fluticasone (Flonase) 50 mcg/actuation nasal spray [id number] Yes Administer 1-2 sprays into each nostril once daily. Keturah Vázquez MD Taking Active   hydroCHLOROthiazide (HYDRODiuril) 50 mg tablet [id number] Yes Take 1 tablet (50 mg) by mouth once daily. Keturah Vázquez MD Taking Active   Discontinued 05/04/23 1023   lansoprazole (Prevacid) 30 mg DR capsule [id number]  Take 1 capsule (30 mg) by mouth once daily in the morning. Take before meals. Antonia Machado MD  Active   losartan (Cozaar) 100 mg tablet [id number] Yes Take 1 tablet (100 mg) by mouth once daily. Keturah Vázquez MD Taking Active   Discontinued 05/04/23 1023   metFORMIN XR (Glucophage-XR) 500 mg 24 hr tablet [id number]  Take 2 tablets in the AM, and 1 tablet PM Antonia Machado MD  Active                     Objective       8/11/2022     9:24 AM 9/14/2022     1:08 PM 11/14/2022     9:12 AM 11/21/2022     1:33 PM 11/29/2022     1:45 PM 2/1/2023     9:46 AM 5/4/2023     9:49 AM   Vitals   Systolic 136 152 146  89 132 128   Diastolic 72 84 84  63 80 86   Heart Rate 97 87 108  82 94 93   Temp 36.1 °C (96.9 °F)  35.7 °C (96.2 °F) 36.5 °C (97.7 °F)  36.2 °C (97.1 °F) 36.2 °C (97.2 °F)   Resp 19  19   18 18   Height (in) 1.473 m (4' 10\") 1.473 m (4' 10\") 1.473 m (4' 10\") " "1.473 m (4' 10\") 1.473 m (4' 10\") 1.473 m (4' 10\") 1.549 m (5' 1\")   Weight (lb) 159.5 159.39 161 161  162 164   BMI 33.34 kg/m2 33.31 kg/m2 33.65 kg/m2 33.65 kg/m2 33.65 kg/m2 33.86 kg/m2 30.99 kg/m2   BSA (m2) 1.72 m2 1.72 m2 1.73 m2 1.73 m2 1.73 m2 1.73 m2 1.79 m2   Visit Report       Report     Physical Exam  Constitutional:       General: She is not in acute distress.     Appearance: Normal appearance. She is not ill-appearing or toxic-appearing.   HENT:      Head: Normocephalic.   Eyes:      Extraocular Movements: Extraocular movements intact.      Conjunctiva/sclera: Conjunctivae normal.   Cardiovascular:      Rate and Rhythm: Normal rate and regular rhythm.      Heart sounds: Murmur heard.      Systolic murmur is present with a grade of 2/6.      No friction rub. No gallop.   Pulmonary:      Effort: Pulmonary effort is normal. No respiratory distress.      Breath sounds: Normal breath sounds. Decreased air movement present. No stridor. No wheezing, rhonchi or rales.      Comments: Clear to auscultation bilaterally but diminished  Abdominal:      General: Abdomen is flat. There is no distension.      Palpations: Abdomen is soft.      Tenderness: There is no abdominal tenderness. There is no guarding.   Musculoskeletal:         General: Normal range of motion.   Skin:     General: Skin is warm and dry.   Neurological:      General: No focal deficit present.      Mental Status: She is alert. Mental status is at baseline.         Assessment/Plan     Aisha Alcantar is a 72 y.o. female, history of fibromyalgia, COPD, DM II, HLD, current smoker, iron deficiency, GERD,  who presents for Follow-up (3month )    Problem List Items Addressed This Visit       Hiatal hernia - Primary     - c/w lansoprazole 30 mg qd          Relevant Medications    lansoprazole (Prevacid) 30 mg DR capsule    Polycythemia     >>ASSESSMENT AND PLAN FOR ELEVATED HEMOGLOBIN (CMS/HCC) WRITTEN ON 5/4/2023  7:34 PM BY CHEMA IRELAND MD    - " patient have elevated Hgb, mild but stable, it is possible that this is reactive given her epo is elevated/normal  - negative JAK2 mutation  - have JAKOB based on sleep studies - however declined to be on CPAP right now - doesn't want to drive far to get it, and also declined pulm referral  - seen heme/onc, and received phlebotomy once, unsure how it would help if the polycythemia is secondary to body's own stimulation, as mild polycythemia is clinically insignificant  - f/u CBC as above  - will repeat epo as below          Relevant Orders    CBC and Auto Differential    Basic metabolic panel    Erythropoietin    Benign essential hypertension     - c/w losartan 100 mg qd  - c/w HCTZ 50 qd  - c/w amlodipine 10 mg every day          Relevant Medications    amLODIPine (Norvasc) 10 mg tablet    hydroCHLOROthiazide (HYDRODiuril) 50 mg tablet    losartan (Cozaar) 100 mg tablet    Hyperlipidemia     - c/w atorvastatin 40 mg          Relevant Orders    Lipid Panel    Allergic rhinitis     - c/w cetirizine 10 mg qd         Relevant Medications    fluticasone (Flonase) 50 mcg/actuation nasal spray    Aortic stenosis     - mild as stated on last echo 3/2022  - advised to follow up with cardiologist   - will repeat in 2 years given mild          Relevant Medications    amLODIPine (Norvasc) 10 mg tablet    Chronic obstructive pulmonary disease (CMS/HCC)     - FEV1 52-54% based on PFT 7/2020 (GOLD 2)  - c/w pro air   - patient is technically group B for GOLD, low risk of exacerbation but high symptomatic, technically treatment is etiher LABA or LAMA, however, have trial of prescribed either LABA or LAMA in the past, patient didn't  any of it because of the cost, s/p trial of Breztri and Stiolto, Trelegy works but expensive  - will observe for now, can consider other controller at next visit          Controlled diabetes mellitus with neurologic complication (CMS/HCC)     - c/w metformin 1000 mg qAM and 500 mg qPM  - f/u A1C  "  - UACR noted negative 2/1/2023  - unremarkable monofilament test on 11/14/22  - advised to see opthalmology yearly         Relevant Medications    metFORMIN XR (Glucophage-XR) 500 mg 24 hr tablet    Other Relevant Orders    Hemoglobin A1c    Iron deficiency     - low sats but otherwise iron level is good  - have improved  - c/w iron supplements  Lab Results   Component Value Date    FERRITIN 29 02/01/2023     Lab Results   Component Value Date    IRON 89 02/01/2023    TIBC 392 02/01/2023    FERRITIN 29 02/01/2023              Nicotine dependence     - tried chantix in the past, didn't work  - doesn't want to try anything at this time         Relevant Medications    aspirin 81 mg chewable tablet    Health care maintenance     last medicare wellness visit 2/1/23, physical 8/11/22  Diabetes Screening/monitoring: f/u A1C as above  Lipid screening:  ordered  STD/HIV Screening: declined  Last Mammogram: declined mammogram - have been normal for the past years, as per patient \" too painful\"  Last Pap smear: aged out   Lung Cancer Screening:last CT 11/2022: noted multiple benign nodules, advised repeat in 1 year   Hepatitis C Screening: negative 10/2021  DEXA Scan: osteopenia on 5/2022, c/w vit D and calcium,   Colonoscopy: cologuard negative 10/2021  Vaccination s/p PCV 13 on 9/ 2019 (at age 68), s/p PPSV 2014 (at age 63), s/p 9/2022 , s/p COVID including bilvalent 9/2022, s/p Tdap 2014, declined shingrix for now         Relevant Medications    aspirin 81 mg chewable tablet     Follow up as noted below  Future Appointments   Date Time Provider Department Center   7/6/2023  9:10 AM Antonia Machado MD [facility]     "

## 2023-05-04 NOTE — ASSESSMENT & PLAN NOTE
- patient have elevated Hgb, mild but stable, it is possible that this is reactive given her epo is elevated/normal  - negative JAK2 mutation  - have JAKOB based on sleep studies - however declined to be on CPAP right now - doesn't want to drive far to get it, and also declined pulm referral  - seen heme/onc, and received phlebotomy once, unsure how it would help if the polycythemia is secondary to body's own stimulation, as mild polycythemia is clinically insignificant  - f/u CBC as above

## 2023-05-04 NOTE — ASSESSMENT & PLAN NOTE
"last medicare wellness visit 2/1/23, physical 8/11/22  Diabetes Screening/monitoring: f/u A1C as above  Lipid screening:  ordered  STD/HIV Screening: declined  Last Mammogram: declined mammogram - have been normal for the past years, as per patient \" too painful\"  Last Pap smear: aged out   Lung Cancer Screening:last CT 11/2022: noted multiple benign nodules, advised repeat in 1 year   Hepatitis C Screening: negative 10/2021  DEXA Scan: osteopenia on 5/2022, c/w vit D and calcium,   Colonoscopy: cologuard negative 10/2021  Vaccination s/p PCV 13 on 9/ 2019 (at age 68), s/p PPSV 2014 (at age 63), s/p 9/2022 , s/p COVID including bilvalent 9/2022, s/p Tdap 2014, declined shingrix for now  "

## 2023-05-05 LAB
ANION GAP IN SER/PLAS: 13 MMOL/L (ref 10–20)
BASOPHILS (10*3/UL) IN BLOOD BY AUTOMATED COUNT: 0.03 X10E9/L (ref 0–0.1)
BASOPHILS/100 LEUKOCYTES IN BLOOD BY AUTOMATED COUNT: 0.3 % (ref 0–2)
CALCIUM (MG/DL) IN SER/PLAS: 9.5 MG/DL (ref 8.6–10.6)
CARBON DIOXIDE, TOTAL (MMOL/L) IN SER/PLAS: 33 MMOL/L (ref 21–32)
CHLORIDE (MMOL/L) IN SER/PLAS: 91 MMOL/L (ref 98–107)
CHOLESTEROL (MG/DL) IN SER/PLAS: 145 MG/DL (ref 0–199)
CHOLESTEROL IN HDL (MG/DL) IN SER/PLAS: 66.5 MG/DL
CHOLESTEROL/HDL RATIO: 2.2
CREATININE (MG/DL) IN SER/PLAS: 0.67 MG/DL (ref 0.5–1.05)
EOSINOPHILS (10*3/UL) IN BLOOD BY AUTOMATED COUNT: 0.05 X10E9/L (ref 0–0.4)
EOSINOPHILS/100 LEUKOCYTES IN BLOOD BY AUTOMATED COUNT: 0.5 % (ref 0–6)
ERYTHROCYTE DISTRIBUTION WIDTH (RATIO) BY AUTOMATED COUNT: 13.7 % (ref 11.5–14.5)
ERYTHROCYTE MEAN CORPUSCULAR HEMOGLOBIN CONCENTRATION (G/DL) BY AUTOMATED: 32 G/DL (ref 32–36)
ERYTHROCYTE MEAN CORPUSCULAR VOLUME (FL) BY AUTOMATED COUNT: 89 FL (ref 80–100)
ERYTHROCYTES (10*6/UL) IN BLOOD BY AUTOMATED COUNT: 5.28 X10E12/L (ref 4–5.2)
ESTIMATED AVERAGE GLUCOSE FOR HBA1C: 143 MG/DL
GFR FEMALE: >90 ML/MIN/1.73M2
GLUCOSE (MG/DL) IN SER/PLAS: 122 MG/DL (ref 74–99)
HEMATOCRIT (%) IN BLOOD BY AUTOMATED COUNT: 46.9 % (ref 36–46)
HEMOGLOBIN (G/DL) IN BLOOD: 15 G/DL (ref 12–16)
HEMOGLOBIN A1C/HEMOGLOBIN TOTAL IN BLOOD: 6.6 %
IMMATURE GRANULOCYTES/100 LEUKOCYTES IN BLOOD BY AUTOMATED COUNT: 0.3 % (ref 0–0.9)
LDL: 60 MG/DL (ref 0–99)
LEUKOCYTES (10*3/UL) IN BLOOD BY AUTOMATED COUNT: 10.8 X10E9/L (ref 4.4–11.3)
LYMPHOCYTES (10*3/UL) IN BLOOD BY AUTOMATED COUNT: 3.01 X10E9/L (ref 0.8–3)
LYMPHOCYTES/100 LEUKOCYTES IN BLOOD BY AUTOMATED COUNT: 27.9 % (ref 13–44)
MONOCYTES (10*3/UL) IN BLOOD BY AUTOMATED COUNT: 0.81 X10E9/L (ref 0.05–0.8)
MONOCYTES/100 LEUKOCYTES IN BLOOD BY AUTOMATED COUNT: 7.5 % (ref 2–10)
NEUTROPHILS (10*3/UL) IN BLOOD BY AUTOMATED COUNT: 6.85 X10E9/L (ref 1.6–5.5)
NEUTROPHILS/100 LEUKOCYTES IN BLOOD BY AUTOMATED COUNT: 63.5 % (ref 40–80)
PLATELETS (10*3/UL) IN BLOOD AUTOMATED COUNT: 384 X10E9/L (ref 150–450)
POTASSIUM (MMOL/L) IN SER/PLAS: 4.5 MMOL/L (ref 3.5–5.3)
SODIUM (MMOL/L) IN SER/PLAS: 132 MMOL/L (ref 136–145)
TRIGLYCERIDE (MG/DL) IN SER/PLAS: 92 MG/DL (ref 0–149)
UREA NITROGEN (MG/DL) IN SER/PLAS: 11 MG/DL (ref 6–23)
VLDL: 18 MG/DL (ref 0–40)

## 2023-05-06 NOTE — RESULT ENCOUNTER NOTE
Lab looks fine but sodium remained to be on the lower side, I recommend nephrology referral at least evaluate one time, likely benign, the rest of the labs are fine, follow up as scheduled

## 2023-05-08 LAB — ERYTHROPOIETIN: 16 MU/ML (ref 4–27)

## 2023-05-30 DIAGNOSIS — J44.9 CHRONIC OBSTRUCTIVE PULMONARY DISEASE, UNSPECIFIED COPD TYPE (MULTI): Primary | ICD-10-CM

## 2023-06-03 RX ORDER — ALBUTEROL SULFATE 90 UG/1
AEROSOL, METERED RESPIRATORY (INHALATION)
Qty: 17 G | Refills: 4 | Status: SHIPPED | OUTPATIENT
Start: 2023-06-03

## 2023-06-05 ENCOUNTER — OFFICE VISIT (OUTPATIENT)
Dept: PRIMARY CARE | Facility: CLINIC | Age: 73
End: 2023-06-05
Payer: MEDICARE

## 2023-06-05 VITALS
HEART RATE: 88 BPM | HEIGHT: 63 IN | BODY MASS INDEX: 28.88 KG/M2 | RESPIRATION RATE: 18 BRPM | SYSTOLIC BLOOD PRESSURE: 136 MMHG | OXYGEN SATURATION: 96 % | WEIGHT: 163 LBS | DIASTOLIC BLOOD PRESSURE: 80 MMHG

## 2023-06-05 DIAGNOSIS — Z01.818 PREOPERATIVE CLEARANCE: Primary | ICD-10-CM

## 2023-06-05 PROCEDURE — 3079F DIAST BP 80-89 MM HG: CPT | Performed by: INTERNAL MEDICINE

## 2023-06-05 PROCEDURE — 99213 OFFICE O/P EST LOW 20 MIN: CPT | Performed by: INTERNAL MEDICINE

## 2023-06-05 PROCEDURE — 3075F SYST BP GE 130 - 139MM HG: CPT | Performed by: INTERNAL MEDICINE

## 2023-06-05 PROCEDURE — 1160F RVW MEDS BY RX/DR IN RCRD: CPT | Performed by: INTERNAL MEDICINE

## 2023-06-05 PROCEDURE — 3044F HG A1C LEVEL LT 7.0%: CPT | Performed by: INTERNAL MEDICINE

## 2023-06-05 PROCEDURE — 1159F MED LIST DOCD IN RCRD: CPT | Performed by: INTERNAL MEDICINE

## 2023-06-05 PROCEDURE — 4010F ACE/ARB THERAPY RXD/TAKEN: CPT | Performed by: INTERNAL MEDICINE

## 2023-06-05 NOTE — PROGRESS NOTES
Subjective   Patient ID:   Aisha Alcantar is a 72 y.o. female, history of fibromyalgia, COPD, DM II, HLD, current smoker, iron deficiency, GERD,  who presents for Pre-op Exam    Cataract surgery  - plan to have cataract surgery , left side on 6/19/23, then  followed by the right side 6/29/23  - will be done in [place] [facility], will be done Dr. Wu , at ophthalmology and coluplastic surgery  - not on any coagulation  - patient is not on insulin,  no history of bleeding disorder, no family history of bleeding disorder  - had surgery in the past without significant adverse effects to anesthesia  - was cleared by cardiologist Dr. Yaneth Ward on 5/23/23, patient with low cardiac risk  - no known hx of TIA, Stroke, or MI      All other (10) organ systems have been reviewed, negative for significant complaint and no change from baseline other than mentioned as per HPI above.    Patient Active Problem List   Diagnosis    Hiatal hernia    Polycythemia    Benign essential hypertension    Hyperlipidemia    Allergic rhinitis    Aortic stenosis    Bilateral leg edema    Chronic allergic conjunctivitis    Chronic obstructive pulmonary disease (CMS/HCC)    Controlled diabetes mellitus with neurologic complication (CMS/HCC)    Coronary artery calcification seen on CAT scan    Depression with anxiety    Eczema    Fibromyalgia    Generalized osteoarthritis of multiple sites    Iron deficiency    Irritable bowel syndrome with diarrhea    Moderate obstructive sleep apnea    Nicotine dependence    Peripheral neuropathy    Right bundle branch block (RBBB) on electrocardiography    Health care maintenance    Elevated hemoglobin (CMS/HCC)        Past Surgical History:   Procedure Laterality Date    APPENDECTOMY  05/20/2013    Appendectomy    CHOLECYSTECTOMY  05/20/2013    Cholecystectomy    GALLBLADDER SURGERY  09/02/2015    Gallbladder Surgery    HERNIA REPAIR  09/02/2015    Hernia Repair    OTHER SURGICAL HISTORY   05/11/2020    Oral surgery       No family history on file.    FHx of MS and parkinsons     Social History    reports that she has been smoking cigarettes. She has been smoking an average of 2 packs per day. She has never used smokeless tobacco. She reports that she does not currently use alcohol. She reports that she does not currently use drugs.  Smoke down to 2 packs a day , live with ,  for 45 years, more than 2 packs a day, started for 20 years, occasional etoh, no illicit drugs, patient have 5 kids,   Allergies   Allergen Reactions    Acetaminophen Unknown     Can take regular tylenol but not extra strengh    Cortisone Unknown     worsens whatever symptom she has    Lidocaine Unknown     She states that anesthetic increases her sensitivity to pain    Naproxen Sodium Unknown    Procaine Unknown       Medication Documentation Review Audit       Reviewed by Antonia Machado MD (Physician) on 06/05/23 at 1834      Medication Order Taking? Sig Documenting Provider Last Dose Status   albuterol 90 mcg/actuation inhaler [id number] Yes USE 2 INHALATIONS EVERY 4 TO 6 HOURS AS NEEDED Antonia Machado MD Taking Active   amLODIPine (Norvasc) 10 mg tablet [id number] Yes Take 1 tablet (10 mg) by mouth once daily. Historical Provider, MD Taking Active   aspirin 81 mg chewable tablet [id number] Yes Chew 1 tablet (81 mg) once daily. Historical Provider, MD Taking Active   atorvastatin (Lipitor) 40 mg tablet [id number] Yes Take 1 tablet (40 mg) by mouth once daily at bedtime. Antonia Machado MD Taking Active   cholecalciferol (Vitamin D-3) 10 MCG (400 UNIT) tablet [id number] Yes Take 1 tablet (400 Units) by mouth once daily. Historical Provider, MD Taking Active   fluticasone (Flonase) 50 mcg/actuation nasal spray [id number] Yes Administer 1-2 sprays into each nostril once daily. Historical Provider, MD Taking Active   hydroCHLOROthiazide (HYDRODiuril) 50 mg tablet [id number] Yes Take 1 tablet (50 mg) by mouth once daily.  "Historical Provider, MD Taking Active   lansoprazole (Prevacid) 30 mg DR capsule [id number] Yes Take 1 capsule (30 mg) by mouth once daily in the morning. Take before meals. Antonia Machado MD Taking Active   losartan (Cozaar) 100 mg tablet [id number] Yes Take 1 tablet (100 mg) by mouth once daily. Historical Provider, MD Taking Active   metFORMIN XR (Glucophage-XR) 500 mg 24 hr tablet [id number] Yes Take 2 tablets in the AM, and 1 tablet PM Antonia Machado MD Taking Active                     Objective       9/14/2022     1:08 PM 11/14/2022     9:12 AM 11/21/2022     1:33 PM 11/29/2022     1:45 PM 2/1/2023     9:46 AM 5/4/2023     9:49 AM 6/5/2023     1:35 PM   Vitals   Systolic 152 146  89 132 128 136   Diastolic 84 84  63 80 86 80   Heart Rate 87 108  82 94 93 88   Temp  35.7 °C (96.2 °F) 36.5 °C (97.7 °F)  36.2 °C (97.1 °F) 36.2 °C (97.2 °F)    Resp  19   18 18 18   Height (in) 1.473 m (4' 10\") 1.473 m (4' 10\") 1.473 m (4' 10\") 1.473 m (4' 10\") 1.473 m (4' 10\") 1.549 m (5' 1\") 1.6 m (5' 3\")   Weight (lb) 159.39 161 161  162 164 163   BMI 33.31 kg/m2 33.65 kg/m2 33.65 kg/m2 33.65 kg/m2 33.86 kg/m2 30.99 kg/m2 28.87 kg/m2   BSA (m2) 1.72 m2 1.73 m2 1.73 m2 1.73 m2 1.73 m2 1.79 m2 1.81 m2   Visit Report      Report Report     Physical Exam  Constitutional:       General: She is not in acute distress.     Appearance: Normal appearance. She is not ill-appearing or toxic-appearing.   HENT:      Head: Normocephalic.   Eyes:      Extraocular Movements: Extraocular movements intact.      Conjunctiva/sclera: Conjunctivae normal.   Cardiovascular:      Rate and Rhythm: Normal rate and regular rhythm.      Heart sounds: Murmur heard.      Systolic murmur is present with a grade of 2/6.      No friction rub. No gallop.   Pulmonary:      Effort: Pulmonary effort is normal. No respiratory distress.      Breath sounds: Normal breath sounds. Decreased air movement present. No stridor. No wheezing, rhonchi or rales.      " Comments: Clear to auscultation bilaterally but diminished  Abdominal:      General: Abdomen is flat. There is no distension.      Palpations: Abdomen is soft.      Tenderness: There is no abdominal tenderness. There is no guarding.   Musculoskeletal:         General: Normal range of motion.   Skin:     General: Skin is warm and dry.   Neurological:      General: No focal deficit present.      Mental Status: She is alert. Mental status is at baseline.         Assessment/Plan     Aisha Alcantar is a 72 y.o. female, history of fibromyalgia, COPD, DM II, HLD, current smoker, iron deficiency, GERD,  who presents for Follow-up (3month )    Problem List Items Addressed This Visit    None  Visit Diagnoses       Preoperative clearance    -  Primary  Pre-op clearance  - patient's RCRI score is 0, patient's is a low risk surgical candidate for low risk surgery  - cleared by cardiologist as per history above  - no known hx of TIA, stroke, or MI  - not on any anticoagulation  - not on insulin, no history of bleeding disorder, no family history of bleeding disorder  - had surgery in the past without significant adverse effects to anesthesia  - no contraindication for surgical procedure, lab as discretion of surgeon  - continue all current medication           Follow up as noted below  Future Appointments   Date Time Provider Department Center   7/12/2023  8:50 AM Antonia Machado MD [address]

## 2023-07-06 ENCOUNTER — APPOINTMENT (OUTPATIENT)
Dept: PRIMARY CARE | Facility: CLINIC | Age: 73
End: 2023-07-06
Payer: MEDICARE

## 2023-07-12 ENCOUNTER — OFFICE VISIT (OUTPATIENT)
Dept: PRIMARY CARE | Facility: CLINIC | Age: 73
End: 2023-07-12
Payer: MEDICARE

## 2023-07-12 VITALS
HEART RATE: 82 BPM | RESPIRATION RATE: 18 BRPM | OXYGEN SATURATION: 98 % | SYSTOLIC BLOOD PRESSURE: 142 MMHG | DIASTOLIC BLOOD PRESSURE: 78 MMHG

## 2023-07-12 DIAGNOSIS — I35.0 NONRHEUMATIC AORTIC VALVE STENOSIS: ICD-10-CM

## 2023-07-12 DIAGNOSIS — D75.1 POLYCYTHEMIA: ICD-10-CM

## 2023-07-12 DIAGNOSIS — J30.89 ALLERGIC RHINITIS DUE TO OTHER ALLERGIC TRIGGER, UNSPECIFIED SEASONALITY: ICD-10-CM

## 2023-07-12 DIAGNOSIS — K44.9 HIATAL HERNIA: ICD-10-CM

## 2023-07-12 DIAGNOSIS — E11.49 CONTROLLED TYPE 2 DIABETES MELLITUS WITH OTHER NEUROLOGIC COMPLICATION, WITHOUT LONG-TERM CURRENT USE OF INSULIN (MULTI): ICD-10-CM

## 2023-07-12 DIAGNOSIS — F17.209 NICOTINE DEPENDENCE WITH NICOTINE-INDUCED DISORDER, UNSPECIFIED NICOTINE PRODUCT TYPE: ICD-10-CM

## 2023-07-12 DIAGNOSIS — Z98.49 STATUS POST CATARACT EXTRACTION, UNSPECIFIED LATERALITY: ICD-10-CM

## 2023-07-12 DIAGNOSIS — E61.1 IRON DEFICIENCY: ICD-10-CM

## 2023-07-12 DIAGNOSIS — Z00.00 HEALTH CARE MAINTENANCE: ICD-10-CM

## 2023-07-12 DIAGNOSIS — I10 BENIGN ESSENTIAL HYPERTENSION: ICD-10-CM

## 2023-07-12 DIAGNOSIS — I10 PRIMARY HYPERTENSION: Primary | ICD-10-CM

## 2023-07-12 DIAGNOSIS — J44.9 CHRONIC OBSTRUCTIVE PULMONARY DISEASE, UNSPECIFIED COPD TYPE (MULTI): ICD-10-CM

## 2023-07-12 PROCEDURE — 3078F DIAST BP <80 MM HG: CPT | Performed by: INTERNAL MEDICINE

## 2023-07-12 PROCEDURE — 1160F RVW MEDS BY RX/DR IN RCRD: CPT | Performed by: INTERNAL MEDICINE

## 2023-07-12 PROCEDURE — 4010F ACE/ARB THERAPY RXD/TAKEN: CPT | Performed by: INTERNAL MEDICINE

## 2023-07-12 PROCEDURE — 3044F HG A1C LEVEL LT 7.0%: CPT | Performed by: INTERNAL MEDICINE

## 2023-07-12 PROCEDURE — 1159F MED LIST DOCD IN RCRD: CPT | Performed by: INTERNAL MEDICINE

## 2023-07-12 PROCEDURE — 99214 OFFICE O/P EST MOD 30 MIN: CPT | Performed by: INTERNAL MEDICINE

## 2023-07-12 PROCEDURE — 3077F SYST BP >= 140 MM HG: CPT | Performed by: INTERNAL MEDICINE

## 2023-07-12 RX ORDER — AMLODIPINE BESYLATE 10 MG/1
10 TABLET ORAL DAILY
Qty: 90 TABLET | Refills: 3 | Status: SHIPPED | OUTPATIENT
Start: 2023-07-12 | End: 2024-03-25 | Stop reason: SDUPTHER

## 2023-07-12 RX ORDER — SODIUM CHLORIDE 50 MG/G
OINTMENT OPHTHALMIC
COMMUNITY
Start: 2023-06-20 | End: 2023-10-18 | Stop reason: ALTCHOICE

## 2023-07-12 RX ORDER — ASPIRIN/CALCIUM CARB/MAGNESIUM 325 MG
TABLET ORAL
COMMUNITY
Start: 2023-07-06 | End: 2023-07-12 | Stop reason: SDUPTHER

## 2023-07-12 RX ORDER — ASPIRIN/CALCIUM CARB/MAGNESIUM 325 MG
4 TABLET ORAL EVERY 2 HOUR PRN
Qty: 100 LOZENGE | Refills: 11 | Status: SHIPPED | OUTPATIENT
Start: 2023-07-12 | End: 2023-10-18 | Stop reason: ALTCHOICE

## 2023-07-12 NOTE — ASSESSMENT & PLAN NOTE
"last medicare wellness visit 2/1/23, physical 8/11/22  Diabetes Screening/monitoring:   Lab Results   Component Value Date    HGBA1C 6.6 (A) 05/04/2023       Lipid screening:  ordered  STD/HIV Screening: declined  Last Mammogram: declined mammogram - have been normal for the past years, as per patient \" too painful\"  Last Pap smear: aged out   Lung Cancer Screening:last CT 11/2022: noted multiple benign nodules, advised repeat in 1 year   Hepatitis C Screening: negative 10/2021  DEXA Scan: osteopenia on 5/2022, c/w vit D and calcium,   Colonoscopy: cologuard negative 10/2021, repeat 10/2024  Vaccination s/p PCV 13 on 9/ 2019 (at age 68), s/p PPSV 2014 (at age 63), s/p 9/2022 , s/p COVID including bilvalent 9/2022, s/p Tdap 2014, declined shingrix for now  "

## 2023-07-12 NOTE — ASSESSMENT & PLAN NOTE
>>ASSESSMENT AND PLAN FOR ELEVATED HEMOGLOBIN (CMS/HCC) WRITTEN ON 5/4/2023  7:34 PM BY CHEMA IRELAND MD    - largely resolved, patient had elevated Hgb, mild but stable, it is possible that this is reactive given her epo is elevated/normal  - negative JAK2 mutation  - have JAKOB based on sleep studies - however declined to be on CPAP right now - doesn't want to drive far to get it, and also declined pulm referral  - seen heme/onc, and received phlebotomy once, unsure how it would help if the polycythemia is secondary to body's own stimulation, as mild polycythemia is clinically insignificant  Lab Results   Component Value Date    WBC 10.8 05/04/2023    HGB 15.0 05/04/2023    HCT 46.9 (H) 05/04/2023    MCV 89 05/04/2023     05/04/2023

## 2023-07-12 NOTE — ASSESSMENT & PLAN NOTE
- c/w metformin 1000 mg qAM and 500 mg qPM     Lab Results   Component Value Date    HGBA1C 6.6 (A) 05/04/2023     - UACR noted negative 2/1/2023  - unremarkable monofilament test on 11/14/22  - advised to see opthalmology yearly

## 2023-07-12 NOTE — PROGRESS NOTES
Subjective   Patient ID:   Aisha Alcantar is a 72 y.o. female, history of fibromyalgia, COPD, DM II, HLD, current smoker, iron deficiency, GERD,  who presents for Follow-up    COPD  - last PFT 7/2020, moderate COPD  - on PRN proair, 1-2x a month  - s/p trial of Stiolto andBreztri   - Trelegy was helpful however unable to afford    mild aortic stenosis   - last echo in 3/2022, noted mild AS  - seen cardiology on 9/2022, normal stress test on 10/2022    DM II  - on metformin 1000 mg qAm and 500 mg qPM  - last A1C was 6.5% on 8/2022    HTN  - on losartan 100 mg qd  - on HCTZ 50mg qd  - on amlodipine 10 mg qd     HLD  - on atorvastatin 40 mg qd     Allergic rhinitis  - cetirizine 10 mg qd    GERD  - lansoprazole 30 mg qd     Iron Deficiency  - take ferrous sulfate 3 x a week  Lab Results   Component Value Date    FERRITIN 29 02/01/2023     Lab Results   Component Value Date    IRON 89 02/01/2023    TIBC 392 02/01/2023    FERRITIN 29 02/01/2023         Current smoker  - tried patch, hypnosis, medication, none works  - planning to  nicotine gum    Back pain  - have it for years, no trauma  - patient with herniated disc, patient also with herniated disc in the neck, and also hip pain, occasionally patient knees goes out  - doesn't always take Tylenol, take 650 mg up to BID and Advil  - no new symptoms in bowel/bladder incontinence, patient occasionally have urinary incontinence, and occasionally have bowel incontinence  - numbness on bilateral feet and occasional hands, no saddle anesthesia     Osteopenia  - on calcium 600 mg BID  - on vitamin D 5000 unit once daily     Polycythemia  - seen heme/onc on 1/2022  - noted likely secondary cause due to smoking  - s/p phlebotomy x1, felt worse after, last seen heme onc was 7/2022  - epo normal , likely secondary to tobacco use     Cataract surgery   - Had catract surgery 6/19/23 and 6/26/23, went well  - have follow up with optometrist in 8/2023     All other (10) organ  systems have been reviewed, negative for significant complaint and no change from baseline other than mentioned as per HPI above.    Patient Active Problem List   Diagnosis    Hiatal hernia    Polycythemia    Primary hypertension    Hyperlipidemia    Allergic rhinitis    Aortic stenosis    Bilateral leg edema    Chronic allergic conjunctivitis    Chronic obstructive pulmonary disease (CMS/HCC)    Controlled diabetes mellitus with neurologic complication (CMS/HCC)    Coronary artery calcification seen on CAT scan    Depression with anxiety    Eczema    Fibromyalgia    Generalized osteoarthritis of multiple sites    Iron deficiency    Irritable bowel syndrome with diarrhea    Moderate obstructive sleep apnea    Nicotine dependence    Peripheral neuropathy    Right bundle branch block (RBBB) on electrocardiography    Health care maintenance    Elevated hemoglobin (CMS/HCC)        Past Surgical History:   Procedure Laterality Date    APPENDECTOMY  05/20/2013    Appendectomy    CHOLECYSTECTOMY  05/20/2013    Cholecystectomy    GALLBLADDER SURGERY  09/02/2015    Gallbladder Surgery    HERNIA REPAIR  09/02/2015    Hernia Repair    OTHER SURGICAL HISTORY  05/11/2020    Oral surgery       No family history on file.    FHx of MS and parkinsons     Social History    reports that she has been smoking cigarettes. She has been smoking an average of 2 packs per day. She has never used smokeless tobacco. She reports that she does not currently use alcohol. She reports that she does not currently use drugs.  Smoke down to 2 packs a day , live with ,  for 45 years, more than 2 packs a day, started for 20 years, occasional etoh, no illicit drugs, patient have 5 kids,   Allergies   Allergen Reactions    Acetaminophen Unknown     Can take regular tylenol but not extra strengh    Cortisone Unknown     worsens whatever symptom she has    Lidocaine Unknown     She states that anesthetic increases her sensitivity to pain     Naproxen Sodium Unknown    Procaine Unknown       Medication Documentation Review Audit       Reviewed by Antonia Machado MD (Physician) on 07/12/23 at 1601      Medication Order Taking? Sig Documenting Provider Last Dose Status   albuterol 90 mcg/actuation inhaler [id number] Yes USE 2 INHALATIONS EVERY 4 TO 6 HOURS AS NEEDED Antonia Machado MD Taking Active   Discontinued 07/12/23 0915   amLODIPine (Norvasc) 10 mg tablet [id number]  Take 1 tablet (10 mg) by mouth once daily. Antonia Machado MD  Active   aspirin 81 mg chewable tablet [id number] Yes Chew 1 tablet (81 mg) once daily. Keturah Provider, MD Taking Active   atorvastatin (Lipitor) 40 mg tablet [id number] Yes Take 1 tablet (40 mg) by mouth once daily at bedtime. Antonia Machado MD Taking Active   cholecalciferol (Vitamin D-3) 10 MCG (400 UNIT) tablet [id number] Yes Take 1 tablet (400 Units) by mouth once daily. Historical Provider, MD Taking Active   fluticasone (Flonase) 50 mcg/actuation nasal spray [id number] Yes Administer 1-2 sprays into each nostril once daily. Historical Provider, MD Taking Active   hydroCHLOROthiazide (HYDRODiuril) 50 mg tablet [id number] Yes Take 1 tablet (50 mg) by mouth once daily. Historical Provider, MD Taking Active   lansoprazole (Prevacid) 30 mg DR capsule [id number] Yes Take 1 capsule (30 mg) by mouth once daily in the morning. Take before meals. Antonia Machado MD Taking Active   losartan (Cozaar) 100 mg tablet [id number] Yes Take 1 tablet (100 mg) by mouth once daily. Keturah Provider, MD Taking Active   metFORMIN XR (Glucophage-XR) 500 mg 24 hr tablet [id number] Yes Take 2 tablets in the AM, and 1 tablet PM Antonia Machado MD Taking Active   Sukhwinder 128 5 % ophthalmic ointment [id number] Yes APPLY 1/4 INCH TO AFFECTED EYE(S) EVERY EVENING Keturah Provider, MD  Active     Discontinued 07/12/23 0916   nicotine polacrilex (Commit) 4 mg lozenge [id number]  Dissolve 1 lozenge (4 mg) in the mouth every 2 hours if needed for  "smoking cessation. Antonia Machado MD  Active                     Objective       11/21/2022     1:33 PM 11/29/2022     1:45 PM 2/1/2023     9:46 AM 5/4/2023     9:49 AM 5/24/2023    12:59 PM 6/5/2023     1:35 PM 7/12/2023     8:58 AM   Vitals   Systolic  89 132 128 133 136 142   Diastolic  63 80 86 51 80 78   Heart Rate  82 94 93 81 88 82   Temp 36.5 °C (97.7 °F)  36.2 °C (97.1 °F) 36.2 °C (97.2 °F)      Resp   18 18  18 18   Height (in) 1.473 m (4' 10\") 1.473 m (4' 10\") 1.473 m (4' 10\") 1.549 m (5' 1\") 1.473 m (4' 10\") 1.6 m (5' 3\")    Weight (lb) 161  162 164  163    BMI 33.65 kg/m2 33.65 kg/m2 33.86 kg/m2 30.99 kg/m2 34.28 kg/m2 28.87 kg/m2    BSA (m2) 1.73 m2 1.73 m2 1.73 m2 1.79 m2 1.74 m2 1.81 m2    Visit Report    Report  Report Report     Physical Exam  Constitutional:       General: She is not in acute distress.     Appearance: Normal appearance. She is not ill-appearing or toxic-appearing.   HENT:      Head: Normocephalic.   Eyes:      Extraocular Movements: Extraocular movements intact.      Conjunctiva/sclera: Conjunctivae normal.   Cardiovascular:      Rate and Rhythm: Normal rate and regular rhythm.      Heart sounds: Murmur heard.      Systolic murmur is present with a grade of 2/6.      No friction rub. No gallop.   Pulmonary:      Effort: Pulmonary effort is normal. No respiratory distress.      Breath sounds: Normal breath sounds. Decreased air movement present. No stridor. No wheezing, rhonchi or rales.      Comments: Clear to auscultation bilaterally but diminished  Abdominal:      General: Abdomen is flat. There is no distension.      Palpations: Abdomen is soft.      Tenderness: There is no abdominal tenderness. There is no guarding.   Musculoskeletal:         General: Normal range of motion.   Skin:     General: Skin is warm and dry.   Neurological:      General: No focal deficit present.      Mental Status: She is alert. Mental status is at baseline.         Assessment/Plan     Aisha JERRY" Ortiz is a 72 y.o. female, history of fibromyalgia, COPD, DM II, HLD, current smoker, iron deficiency, GERD,  who presents for Follow-up    Problem List Items Addressed This Visit       Hiatal hernia     - c/w lansoprazole 30 mg qd          Polycythemia     >>ASSESSMENT AND PLAN FOR ELEVATED HEMOGLOBIN (CMS/HCC) WRITTEN ON 5/4/2023  7:34 PM BY CHEMA IRELAND MD    - largely resolved, patient had elevated Hgb, mild but stable, it is possible that this is reactive given her epo is elevated/normal  - negative JAK2 mutation  - have JAKOB based on sleep studies - however declined to be on CPAP right now - doesn't want to drive far to get it, and also declined pulm referral  - seen heme/onc, and received phlebotomy once, unsure how it would help if the polycythemia is secondary to body's own stimulation, as mild polycythemia is clinically insignificant  Lab Results   Component Value Date    WBC 10.8 05/04/2023    HGB 15.0 05/04/2023    HCT 46.9 (H) 05/04/2023    MCV 89 05/04/2023     05/04/2023              Primary hypertension - Primary     - c/w losartan 100 mg qd  - c/w HCTZ 50 qd  - c/w amlodipine 10 mg every day          Relevant Medications    amLODIPine (Norvasc) 10 mg tablet    Allergic rhinitis     - c/w cetirizine 10 mg qd         Aortic stenosis     - mild as stated on last echo 3/2022  - advised to follow up with cardiologist   - will repeat in 2 years given mild          Relevant Medications    amLODIPine (Norvasc) 10 mg tablet    Chronic obstructive pulmonary disease (CMS/HCC)     - FEV1 52-54% based on PFT 7/2020 (GOLD 2)  - c/w pro air   - patient is technically group B for GOLD, low risk of exacerbation but high symptomatic, technically treatment is etiher LABA or LAMA, however, have trial of prescribed either LABA or LAMA in the past, patient didn't  any of it because of the cost, s/p trial of Breztri and Stiolto, Trelegy works but expensive  - will observe for now, advised to see pulm  "but declined for now         Controlled diabetes mellitus with neurologic complication (CMS/HCC)     - c/w metformin 1000 mg qAM and 500 mg qPM     Lab Results   Component Value Date    HGBA1C 6.6 (A) 05/04/2023   - UACR noted negative 2/1/2023  - unremarkable monofilament test on 11/14/22  - advised to see opthalmology yearly         Iron deficiency     - low sats but otherwise iron level is good  - have improved  - c/w iron supplements  Lab Results   Component Value Date    FERRITIN 29 02/01/2023     Lab Results   Component Value Date    IRON 89 02/01/2023    TIBC 392 02/01/2023    FERRITIN 29 02/01/2023              Nicotine dependence     - tried chantix in the past, didn't work  - sent Nicotine gum          Relevant Medications    nicotine polacrilex (Commit) 4 mg lozenge    Health care maintenance     last medicare wellness visit 2/1/23, physical 8/11/22  Diabetes Screening/monitoring:   Lab Results   Component Value Date    HGBA1C 6.6 (A) 05/04/2023       Lipid screening:  ordered  STD/HIV Screening: declined  Last Mammogram: declined mammogram - have been normal for the past years, as per patient \" too painful\"  Last Pap smear: aged out   Lung Cancer Screening:last CT 11/2022: noted multiple benign nodules, advised repeat in 1 year   Hepatitis C Screening: negative 10/2021  DEXA Scan: osteopenia on 5/2022, c/w vit D and calcium,   Colonoscopy: cologuard negative 10/2021, repeat 10/2024  Vaccination s/p PCV 13 on 9/ 2019 (at age 68), s/p PPSV 2014 (at age 63), s/p 9/2022 , s/p COVID including bilvalent 9/2022, s/p Tdap 2014, declined shingrix for now         S/P cataract surgery     Advised to follow up with optometrist and ophthalmology           Follow up advised in 3 months  No future appointments.    "

## 2023-07-12 NOTE — ASSESSMENT & PLAN NOTE
- FEV1 52-54% based on PFT 7/2020 (GOLD 2)  - c/w pro air   - patient is technically group B for GOLD, low risk of exacerbation but high symptomatic, technically treatment is etiher LABA or LAMA, however, have trial of prescribed either LABA or LAMA in the past, patient didn't  any of it because of the cost, s/p trial of Breztri and Stiolto, Trelegy works but expensive  - will observe for now, advised to see pulm but declined for now

## 2023-08-09 LAB
ALBUMIN (G/DL) IN SER/PLAS: 4.3 G/DL (ref 3.4–5)
ANION GAP IN SER/PLAS: 14 MMOL/L (ref 10–20)
CALCIUM (MG/DL) IN SER/PLAS: 9.4 MG/DL (ref 8.6–10.3)
CARBON DIOXIDE, TOTAL (MMOL/L) IN SER/PLAS: 32 MMOL/L (ref 21–32)
CHLORIDE (MMOL/L) IN SER/PLAS: 95 MMOL/L (ref 98–107)
CHLORIDE (MOL/L) IN SPOT URINE: 61 MMOL/L
CHLORIDE/CREATININE (MMOL/G) IN URINE: 38 MMOL/G CREAT (ref 38–318)
CHOLESTEROL (MG/DL) IN SER/PLAS: 118 MG/DL (ref 0–199)
CHOLESTEROL IN HDL (MG/DL) IN SER/PLAS: 66 MG/DL
CHOLESTEROL/HDL RATIO: 1.8
CREATININE (MG/DL) IN SER/PLAS: 0.71 MG/DL (ref 0.5–1.05)
CREATININE (MG/DL) IN URINE: 160 MG/DL (ref 20–320)
GFR FEMALE: 89 ML/MIN/1.73M2
GLUCOSE (MG/DL) IN SER/PLAS: 135 MG/DL (ref 74–99)
MAGNESIUM (MG/DL) IN SER/PLAS: 1.71 MG/DL (ref 1.6–2.4)
NON-HDL CHOLESTEROL: 52 MG/DL
OSMOLALITY, RANDOM URINE: 690 MOSM/KG (ref 200–1200)
PHOSPHATE (MG/DL) IN SER/PLAS: 4.2 MG/DL (ref 2.5–4.9)
POTASSIUM (MMOL/L) IN SER/PLAS: 4.2 MMOL/L (ref 3.5–5.3)
POTASSIUM URINE RANDOM: 115 MMOL/L
POTASSIUM/CREATININE (MMOL/G) IN URINE: 72 MMOL/G CREAT
PROTEIN TOTAL: 7 G/DL (ref 6.4–8.2)
SODIUM (MMOL/L) IN SER/PLAS: 137 MMOL/L (ref 136–145)
SODIUM URINE RANDOM: 62 MMOL/L
SODIUM/CREATININE (MMOL/G) IN URINE: 39 MMOL/G CREAT
THYROTROPIN (MIU/L) IN SER/PLAS BY DETECTION LIMIT <= 0.05 MIU/L: 0.71 MIU/L (ref 0.44–3.98)
URATE (MG/DL) IN SER/PLAS: 4.1 MG/DL (ref 2.3–6.7)
UREA NITROGEN (MG/DL) IN SER/PLAS: 11 MG/DL (ref 6–23)
UREA NITROGEN, RANDOM URINE: 623 MG/DL
UREA NITROGEN/CREATININE (G/G) URINE: 3.9 G/G CREAT

## 2023-08-10 LAB
CORTISOL (UG/DL) IN SERUM - AM: 15.3 UG/DL (ref 5–20)
OSMOLALITY, SERUM: 291 MOSM/KG H2O (ref 280–300)

## 2023-08-24 PROBLEM — E87.1 HYPONATREMIA: Status: ACTIVE | Noted: 2023-08-24

## 2023-10-17 PROBLEM — Z00.00 HEALTH CARE MAINTENANCE: Status: RESOLVED | Noted: 2023-05-04 | Resolved: 2023-10-17

## 2023-10-17 PROBLEM — D58.2 ELEVATED HEMOGLOBIN (CMS-HCC): Status: RESOLVED | Noted: 2023-05-04 | Resolved: 2023-10-17

## 2023-10-18 ENCOUNTER — TELEPHONE (OUTPATIENT)
Dept: PRIMARY CARE | Facility: CLINIC | Age: 73
End: 2023-10-18

## 2023-10-18 ENCOUNTER — OFFICE VISIT (OUTPATIENT)
Dept: PRIMARY CARE | Facility: CLINIC | Age: 73
End: 2023-10-18
Payer: MEDICARE

## 2023-10-18 VITALS
BODY MASS INDEX: 28.87 KG/M2 | WEIGHT: 163 LBS | DIASTOLIC BLOOD PRESSURE: 76 MMHG | SYSTOLIC BLOOD PRESSURE: 124 MMHG | OXYGEN SATURATION: 93 % | HEART RATE: 83 BPM

## 2023-10-18 DIAGNOSIS — E11.49 TYPE 2 DIABETES MELLITUS WITH OTHER NEUROLOGIC COMPLICATION, WITHOUT LONG-TERM CURRENT USE OF INSULIN (MULTI): Primary | ICD-10-CM

## 2023-10-18 DIAGNOSIS — I10 PRIMARY HYPERTENSION: ICD-10-CM

## 2023-10-18 DIAGNOSIS — J44.9 CHRONIC OBSTRUCTIVE PULMONARY DISEASE, UNSPECIFIED COPD TYPE (MULTI): ICD-10-CM

## 2023-10-18 DIAGNOSIS — E78.2 MIXED HYPERLIPIDEMIA: ICD-10-CM

## 2023-10-18 DIAGNOSIS — M47.816 LUMBAR SPONDYLOSIS: ICD-10-CM

## 2023-10-18 LAB — POC HEMOGLOBIN A1C: 6.9 % (ref 4.2–6.5)

## 2023-10-18 PROCEDURE — 1160F RVW MEDS BY RX/DR IN RCRD: CPT | Performed by: FAMILY MEDICINE

## 2023-10-18 PROCEDURE — 3044F HG A1C LEVEL LT 7.0%: CPT | Performed by: FAMILY MEDICINE

## 2023-10-18 PROCEDURE — 99214 OFFICE O/P EST MOD 30 MIN: CPT | Performed by: FAMILY MEDICINE

## 2023-10-18 PROCEDURE — 3074F SYST BP LT 130 MM HG: CPT | Performed by: FAMILY MEDICINE

## 2023-10-18 PROCEDURE — 1159F MED LIST DOCD IN RCRD: CPT | Performed by: FAMILY MEDICINE

## 2023-10-18 PROCEDURE — 4010F ACE/ARB THERAPY RXD/TAKEN: CPT | Performed by: FAMILY MEDICINE

## 2023-10-18 PROCEDURE — 99204 OFFICE O/P NEW MOD 45 MIN: CPT | Performed by: FAMILY MEDICINE

## 2023-10-18 PROCEDURE — 1125F AMNT PAIN NOTED PAIN PRSNT: CPT | Performed by: FAMILY MEDICINE

## 2023-10-18 PROCEDURE — 3078F DIAST BP <80 MM HG: CPT | Performed by: FAMILY MEDICINE

## 2023-10-18 RX ORDER — CALCIUM CARBONATE 160(400)MG
TABLET,CHEWABLE ORAL
Qty: 1 EACH | Refills: 0 | Status: SHIPPED | OUTPATIENT
Start: 2023-10-18

## 2023-10-18 ASSESSMENT — LIFESTYLE VARIABLES
SKIP TO QUESTIONS 9-10: 1
HOW OFTEN DO YOU HAVE SIX OR MORE DRINKS ON ONE OCCASION: NEVER
HOW MANY STANDARD DRINKS CONTAINING ALCOHOL DO YOU HAVE ON A TYPICAL DAY: 1 OR 2
HOW OFTEN DO YOU HAVE A DRINK CONTAINING ALCOHOL: MONTHLY OR LESS
AUDIT-C TOTAL SCORE: 1

## 2023-10-18 ASSESSMENT — ENCOUNTER SYMPTOMS
LOSS OF SENSATION IN FEET: 0
DEPRESSION: 0
OCCASIONAL FEELINGS OF UNSTEADINESS: 1

## 2023-10-18 ASSESSMENT — PATIENT HEALTH QUESTIONNAIRE - PHQ9
SUM OF ALL RESPONSES TO PHQ9 QUESTIONS 1 & 2: 0
1. LITTLE INTEREST OR PLEASURE IN DOING THINGS: NOT AT ALL
2. FEELING DOWN, DEPRESSED OR HOPELESS: NOT AT ALL

## 2023-10-18 ASSESSMENT — PAIN SCALES - GENERAL: PAINLEVEL: 2

## 2023-10-18 NOTE — PROGRESS NOTES
Subjective   Patient ID: Aisha Alcantar is a 72 y.o. female who presents for New Patient Visit.    Colonoscopy: 10/19/21 - cologuard   Mammogram: 9/18/23  Pap Smear: NA  Bone Density: 5/24/22   Immunizations:    For DM2:  A1c: 6.9  Follow up: On metformin.  Neuropathy - ?DM vs back per patient   Hypoglycemic Episodes: none  Glucose monitoring:  does not check  Eye Exam:  9/9/22  Foot Exam:    Hypertension  -Patient is here for follow-up of elevated blood pressure.   -Blood pressure is well controlled at home.   -Cardiac symptoms: none.   -Patient denies chest pain, dyspnea, and irregular heart beat.   -Cardiologist:    Specialist:  Dr. Han - nephrology - low sodium  Dr. Ward - cardiology - aortic stenosis           Review of Systems    Objective   /76 (BP Location: Right arm, Patient Position: Sitting)   Pulse 83   Wt 73.9 kg (163 lb)   SpO2 93%   BMI 28.87 kg/m²     Physical Exam  Vitals reviewed.   Constitutional:       General: She is not in acute distress.  Cardiovascular:      Rate and Rhythm: Normal rate and regular rhythm.      Heart sounds: Murmur heard.      Systolic murmur is present with a grade of 2/6.   Pulmonary:      Effort: Pulmonary effort is normal.      Breath sounds: No wheezing or rhonchi.   Musculoskeletal:      Right lower leg: No edema.      Left lower leg: No edema.   Lymphadenopathy:      Cervical: No cervical adenopathy.   Neurological:      Mental Status: She is alert.       Assessment/Plan   Diagnoses and all orders for this visit:  Type 2 diabetes mellitus with other neurologic complication, without long-term current use of insulin (CMS/[facility])  Mixed hyperlipidemia  Primary hypertension  Lumbar spondylosis  Chronic obstructive pulmonary disease, unspecified COPD type (CMS/[facility])

## 2023-10-18 NOTE — PATIENT INSTRUCTIONS
Here to establish.      For Dm2 - A1c is 6.9.  Continue with metformin.  Continue with healthy diet, decrease processed foods/added sugar foods.     -For HTN - continue amlodipine, hydrochlorothiazide and losartan    -For cholesterol - continue atorvastatin.     -For low back pain - script for rollator.  Can try [facility] on [address]     -For COPD - continue albuterol.  Recommend smoking cessation.     Follow up in 3 months - blood work 1 week prior.

## 2023-11-15 ENCOUNTER — TELEPHONE (OUTPATIENT)
Dept: PRIMARY CARE | Facility: CLINIC | Age: 73
End: 2023-11-15
Payer: MEDICARE

## 2023-11-15 DIAGNOSIS — R91.8 PULMONARY NODULES: Primary | ICD-10-CM

## 2023-11-15 NOTE — TELEPHONE ENCOUNTER
Pt is calling for an Order, she has a 2 spots on her lungs, and she was told to recheck Nov. So she needs an order to get it recheck. Please call [phone]

## 2023-11-28 ENCOUNTER — HOSPITAL ENCOUNTER (OUTPATIENT)
Dept: RADIOLOGY | Facility: HOSPITAL | Age: 73
End: 2023-11-28
Payer: MEDICARE

## 2023-12-05 ENCOUNTER — HOSPITAL ENCOUNTER (OUTPATIENT)
Dept: RADIOLOGY | Facility: HOSPITAL | Age: 73
Discharge: HOME | End: 2023-12-05
Payer: MEDICARE

## 2023-12-05 DIAGNOSIS — R91.8 PULMONARY NODULES: ICD-10-CM

## 2023-12-05 PROCEDURE — 71250 CT THORAX DX C-: CPT

## 2023-12-05 PROCEDURE — 71250 CT THORAX DX C-: CPT | Performed by: RADIOLOGY

## 2023-12-10 DIAGNOSIS — I10 PRIMARY HYPERTENSION: ICD-10-CM

## 2023-12-10 RX ORDER — LOSARTAN POTASSIUM 100 MG/1
100 TABLET ORAL DAILY
Qty: 90 TABLET | Refills: 1 | Status: SHIPPED | OUTPATIENT
Start: 2023-12-10 | End: 2024-03-25 | Stop reason: SDUPTHER

## 2024-01-14 DIAGNOSIS — J30.89 ALLERGIC RHINITIS DUE TO OTHER ALLERGIC TRIGGER, UNSPECIFIED SEASONALITY: ICD-10-CM

## 2024-01-14 DIAGNOSIS — I10 PRIMARY HYPERTENSION: ICD-10-CM

## 2024-01-14 RX ORDER — HYDROCHLOROTHIAZIDE 50 MG/1
50 TABLET ORAL DAILY
Qty: 60 TABLET | Refills: 5 | Status: SHIPPED | OUTPATIENT
Start: 2024-01-14 | End: 2024-02-15 | Stop reason: SDUPTHER

## 2024-01-14 RX ORDER — FLUTICASONE PROPIONATE 50 MCG
2 SPRAY, SUSPENSION (ML) NASAL DAILY
Qty: 16 G | Refills: 2 | Status: SHIPPED | OUTPATIENT
Start: 2024-01-14

## 2024-01-20 DIAGNOSIS — K21.9 GASTROESOPHAGEAL REFLUX DISEASE, UNSPECIFIED WHETHER ESOPHAGITIS PRESENT: ICD-10-CM

## 2024-01-21 RX ORDER — LANSOPRAZOLE 30 MG/1
30 CAPSULE, DELAYED RELEASE ORAL
Qty: 90 CAPSULE | Refills: 1 | Status: SHIPPED | OUTPATIENT
Start: 2024-01-21

## 2024-01-22 ENCOUNTER — LAB (OUTPATIENT)
Dept: LAB | Facility: LAB | Age: 74
End: 2024-01-22
Payer: MEDICARE

## 2024-01-22 DIAGNOSIS — E87.1 HYPO-OSMOLALITY AND HYPONATREMIA: Primary | ICD-10-CM

## 2024-01-22 DIAGNOSIS — E78.2 MIXED HYPERLIPIDEMIA: ICD-10-CM

## 2024-01-22 DIAGNOSIS — E11.49 TYPE 2 DIABETES MELLITUS WITH OTHER NEUROLOGIC COMPLICATION, WITHOUT LONG-TERM CURRENT USE OF INSULIN (MULTI): ICD-10-CM

## 2024-01-22 DIAGNOSIS — I10 PRIMARY HYPERTENSION: ICD-10-CM

## 2024-01-22 LAB
ALBUMIN SERPL BCP-MCNC: 4.3 G/DL (ref 3.4–5)
ALP SERPL-CCNC: 52 U/L (ref 33–136)
ALT SERPL W P-5'-P-CCNC: 13 U/L (ref 7–45)
ANION GAP SERPL CALC-SCNC: 18 MMOL/L (ref 10–20)
AST SERPL W P-5'-P-CCNC: 16 U/L (ref 9–39)
BASOPHILS # BLD AUTO: 0.04 X10*3/UL (ref 0–0.1)
BASOPHILS NFR BLD AUTO: 0.4 %
BILIRUB SERPL-MCNC: 0.6 MG/DL (ref 0–1.2)
BUN SERPL-MCNC: 11 MG/DL (ref 6–23)
CALCIUM SERPL-MCNC: 9.1 MG/DL (ref 8.6–10.3)
CHLORIDE SERPL-SCNC: 90 MMOL/L (ref 98–107)
CHOLEST SERPL-MCNC: 108 MG/DL (ref 0–199)
CHOLESTEROL/HDL RATIO: 2
CO2 SERPL-SCNC: 31 MMOL/L (ref 21–32)
CREAT SERPL-MCNC: 0.63 MG/DL (ref 0.5–1.05)
EGFRCR SERPLBLD CKD-EPI 2021: >90 ML/MIN/1.73M*2
EOSINOPHIL # BLD AUTO: 0.02 X10*3/UL (ref 0–0.4)
EOSINOPHIL NFR BLD AUTO: 0.2 %
ERYTHROCYTE [DISTWIDTH] IN BLOOD BY AUTOMATED COUNT: 14 % (ref 11.5–14.5)
EST. AVERAGE GLUCOSE BLD GHB EST-MCNC: 140 MG/DL
GLUCOSE SERPL-MCNC: 134 MG/DL (ref 74–99)
HBA1C MFR BLD: 6.5 %
HCT VFR BLD AUTO: 46.7 % (ref 36–46)
HDLC SERPL-MCNC: 53.8 MG/DL
HGB BLD-MCNC: 15.1 G/DL (ref 12–16)
IMM GRANULOCYTES # BLD AUTO: 0.03 X10*3/UL (ref 0–0.5)
IMM GRANULOCYTES NFR BLD AUTO: 0.3 % (ref 0–0.9)
LDLC SERPL CALC-MCNC: 36 MG/DL
LYMPHOCYTES # BLD AUTO: 3.36 X10*3/UL (ref 0.8–3)
LYMPHOCYTES NFR BLD AUTO: 32.2 %
MAGNESIUM SERPL-MCNC: 1.38 MG/DL (ref 1.6–2.4)
MCH RBC QN AUTO: 28.1 PG (ref 26–34)
MCHC RBC AUTO-ENTMCNC: 32.3 G/DL (ref 32–36)
MCV RBC AUTO: 87 FL (ref 80–100)
MONOCYTES # BLD AUTO: 0.85 X10*3/UL (ref 0.05–0.8)
MONOCYTES NFR BLD AUTO: 8.2 %
NEUTROPHILS # BLD AUTO: 6.12 X10*3/UL (ref 1.6–5.5)
NEUTROPHILS NFR BLD AUTO: 58.7 %
NON HDL CHOLESTEROL: 54 MG/DL (ref 0–149)
NRBC BLD-RTO: 0 /100 WBCS (ref 0–0)
OSMOLALITY SERPL: 278 MOSM/KG (ref 280–300)
PHOSPHATE SERPL-MCNC: 4 MG/DL (ref 2.5–4.9)
PLATELET # BLD AUTO: 393 X10*3/UL (ref 150–450)
POTASSIUM SERPL-SCNC: 4.6 MMOL/L (ref 3.5–5.3)
PROT SERPL-MCNC: 6.5 G/DL (ref 6.4–8.2)
RBC # BLD AUTO: 5.37 X10*6/UL (ref 4–5.2)
SODIUM SERPL-SCNC: 134 MMOL/L (ref 136–145)
TRIGL SERPL-MCNC: 92 MG/DL (ref 0–149)
TSH SERPL-ACNC: 0.52 MIU/L (ref 0.44–3.98)
VLDL: 18 MG/DL (ref 0–40)
WBC # BLD AUTO: 10.4 X10*3/UL (ref 4.4–11.3)

## 2024-01-22 PROCEDURE — 84443 ASSAY THYROID STIM HORMONE: CPT

## 2024-01-22 PROCEDURE — 83036 HEMOGLOBIN GLYCOSYLATED A1C: CPT

## 2024-01-22 PROCEDURE — 83930 ASSAY OF BLOOD OSMOLALITY: CPT

## 2024-01-22 PROCEDURE — 84300 ASSAY OF URINE SODIUM: CPT

## 2024-01-22 PROCEDURE — 82570 ASSAY OF URINE CREATININE: CPT

## 2024-01-22 PROCEDURE — 85025 COMPLETE CBC W/AUTO DIFF WBC: CPT

## 2024-01-22 PROCEDURE — 84133 ASSAY OF URINE POTASSIUM: CPT

## 2024-01-22 PROCEDURE — 83735 ASSAY OF MAGNESIUM: CPT

## 2024-01-22 PROCEDURE — 80061 LIPID PANEL: CPT

## 2024-01-22 PROCEDURE — 80053 COMPREHEN METABOLIC PANEL: CPT

## 2024-01-22 PROCEDURE — 82436 ASSAY OF URINE CHLORIDE: CPT

## 2024-01-22 PROCEDURE — 36415 COLL VENOUS BLD VENIPUNCTURE: CPT

## 2024-01-22 PROCEDURE — 84100 ASSAY OF PHOSPHORUS: CPT

## 2024-01-23 ENCOUNTER — TELEPHONE (OUTPATIENT)
Dept: NEPHROLOGY | Facility: CLINIC | Age: 74
End: 2024-01-23
Payer: MEDICARE

## 2024-01-23 DIAGNOSIS — E87.1 HYPONATREMIA: ICD-10-CM

## 2024-01-23 DIAGNOSIS — E83.42 HYPOMAGNESEMIA: Primary | ICD-10-CM

## 2024-01-23 LAB
CHLORIDE UR-SCNC: 46 MMOL/L
CHLORIDE/CREATININE (MMOL/G) IN URINE: 22 MMOL/G CREAT (ref 38–318)
CREAT UR-MCNC: 213.1 MG/DL (ref 20–320)
POTASSIUM UR-SCNC: 93 MMOL/L
POTASSIUM/CREAT UR-RTO: 44 MMOL/G CREAT
SODIUM UR-SCNC: 49 MMOL/L
SODIUM/CREAT UR-RTO: 23 MMOL/G CREAT

## 2024-01-23 RX ORDER — BACLOFEN 20 MG
500 TABLET ORAL DAILY
Qty: 30 TABLET | Refills: 11 | Status: SHIPPED | OUTPATIENT
Start: 2024-01-23 | End: 2024-02-15 | Stop reason: SDUPTHER

## 2024-01-23 NOTE — TELEPHONE ENCOUNTER
----- Message from Sravan Mcdonald MD sent at 1/23/2024 10:39 AM EST -----  Please call  Magnesium and sodium are  low.  I will call in magnesium supplements to start now.  I will put order to repeat magnesium level and sodium level to be done prior to next follow-up  Dr. Harry LOPES

## 2024-02-05 PROBLEM — E78.2 MIXED HYPERLIPIDEMIA: Status: ACTIVE | Noted: 2023-05-04

## 2024-02-05 PROBLEM — F17.200 NICOTINE DEPENDENCE: Status: RESOLVED | Noted: 2023-05-04 | Resolved: 2024-02-05

## 2024-02-05 PROBLEM — I25.10 CORONARY ARTERY CALCIFICATION SEEN ON CAT SCAN: Status: RESOLVED | Noted: 2023-05-04 | Resolved: 2024-02-05

## 2024-02-05 PROBLEM — Z98.49 S/P CATARACT SURGERY: Status: RESOLVED | Noted: 2023-07-12 | Resolved: 2024-02-05

## 2024-02-07 ENCOUNTER — OFFICE VISIT (OUTPATIENT)
Dept: PRIMARY CARE | Facility: CLINIC | Age: 74
End: 2024-02-07
Payer: MEDICARE

## 2024-02-07 VITALS
SYSTOLIC BLOOD PRESSURE: 130 MMHG | DIASTOLIC BLOOD PRESSURE: 76 MMHG | BODY MASS INDEX: 34.36 KG/M2 | OXYGEN SATURATION: 93 % | TEMPERATURE: 98.6 F | HEART RATE: 87 BPM | WEIGHT: 164.4 LBS

## 2024-02-07 DIAGNOSIS — J44.9 CHRONIC OBSTRUCTIVE PULMONARY DISEASE, UNSPECIFIED COPD TYPE (MULTI): ICD-10-CM

## 2024-02-07 DIAGNOSIS — E11.49 CONTROLLED TYPE 2 DIABETES MELLITUS WITH OTHER NEUROLOGIC COMPLICATION, WITHOUT LONG-TERM CURRENT USE OF INSULIN (MULTI): Primary | ICD-10-CM

## 2024-02-07 DIAGNOSIS — I10 BENIGN ESSENTIAL HYPERTENSION: ICD-10-CM

## 2024-02-07 PROCEDURE — 3044F HG A1C LEVEL LT 7.0%: CPT | Performed by: FAMILY MEDICINE

## 2024-02-07 PROCEDURE — 1159F MED LIST DOCD IN RCRD: CPT | Performed by: FAMILY MEDICINE

## 2024-02-07 PROCEDURE — 99214 OFFICE O/P EST MOD 30 MIN: CPT | Performed by: FAMILY MEDICINE

## 2024-02-07 PROCEDURE — 3060F POS MICROALBUMINURIA REV: CPT | Performed by: FAMILY MEDICINE

## 2024-02-07 PROCEDURE — 4010F ACE/ARB THERAPY RXD/TAKEN: CPT | Performed by: FAMILY MEDICINE

## 2024-02-07 PROCEDURE — 1126F AMNT PAIN NOTED NONE PRSNT: CPT | Performed by: FAMILY MEDICINE

## 2024-02-07 PROCEDURE — 1160F RVW MEDS BY RX/DR IN RCRD: CPT | Performed by: FAMILY MEDICINE

## 2024-02-07 PROCEDURE — 3078F DIAST BP <80 MM HG: CPT | Performed by: FAMILY MEDICINE

## 2024-02-07 PROCEDURE — 3075F SYST BP GE 130 - 139MM HG: CPT | Performed by: FAMILY MEDICINE

## 2024-02-07 PROCEDURE — 3048F LDL-C <100 MG/DL: CPT | Performed by: FAMILY MEDICINE

## 2024-02-07 ASSESSMENT — PATIENT HEALTH QUESTIONNAIRE - PHQ9
SUM OF ALL RESPONSES TO PHQ9 QUESTIONS 1 & 2: 0
2. FEELING DOWN, DEPRESSED OR HOPELESS: NOT AT ALL
1. LITTLE INTEREST OR PLEASURE IN DOING THINGS: NOT AT ALL

## 2024-02-07 ASSESSMENT — PAIN SCALES - GENERAL: PAINLEVEL: 0-NO PAIN

## 2024-02-07 ASSESSMENT — ENCOUNTER SYMPTOMS
DEPRESSION: 0
OCCASIONAL FEELINGS OF UNSTEADINESS: 1
LOSS OF SENSATION IN FEET: 0

## 2024-02-07 NOTE — PROGRESS NOTES
Subjective   Patient ID: Aisha Alcantar is a 73 y.o. female who presents for Diabetes (3 MO F/U).    For DM2:  A1c: 6.5  Follow up: On metformin. No new issues  Hypoglycemic Episodes: none  Glucose monitoring:  does not check    Hypertension  -Patient is here for follow-up of elevated blood pressure.   -Blood pressure is well controlled at home.   -Cardiac symptoms: none.   -Patient denies chest pain, dyspnea, and irregular heart beat.   -Cardiologist:    COPD:  -F/U: doing well.  Breathing at baseline   -Medications Using: Albuterol  -Past Medications:  Trelegy  -Rescue Inhaler Use: 1-2 times a day  -Oxygen Use: none  -Nocturnal Symptoms:   -Specialist: none           Review of Systems    Objective   /76 (BP Location: Right arm, Patient Position: Sitting)   Pulse 87   Temp 37 °C (98.6 °F) (Temporal)   Wt 74.6 kg (164 lb 6.4 oz)   SpO2 93%   BMI 34.36 kg/m²     Physical Exam  Vitals reviewed.   Constitutional:       General: She is not in acute distress.  Cardiovascular:      Rate and Rhythm: Normal rate and regular rhythm.      Heart sounds: Murmur heard.      Systolic murmur is present with a grade of 2/6.   Pulmonary:      Effort: Pulmonary effort is normal.      Breath sounds: No wheezing or rhonchi.   Musculoskeletal:      Right lower leg: No edema.      Left lower leg: No edema.      Comments: Compression stockings bilaterally   Lymphadenopathy:      Cervical: No cervical adenopathy.   Neurological:      Mental Status: She is alert.         Assessment/Plan   Diagnoses and all orders for this visit:  Controlled type 2 diabetes mellitus with other neurologic complication, without long-term current use of insulin (CMS/[facility])  Chronic obstructive pulmonary disease, unspecified COPD type (CMS/[facility])  Benign essential hypertension  Other orders  -     Follow Up In Primary Care - Established         Patient Instructions   Here for follow up    For Dm2 - A1c is 6.5. Well controlled on metformin    For blood  pressure - well controlled.  Continue current medications. Seeing nephrology    For COPD - stable on albuterol.      Follow up in 6 months for physical.

## 2024-02-07 NOTE — PATIENT INSTRUCTIONS
Here for follow up    For Dm2 - A1c is 6.5. Well controlled on metformin    For blood pressure - well controlled.  Continue current medications. Seeing nephrology    For COPD - stable on albuterol.      Follow up in 6 months for physical.

## 2024-02-15 ENCOUNTER — OFFICE VISIT (OUTPATIENT)
Dept: NEPHROLOGY | Facility: CLINIC | Age: 74
End: 2024-02-15
Payer: MEDICARE

## 2024-02-15 ENCOUNTER — TELEPHONE (OUTPATIENT)
Dept: PRIMARY CARE | Facility: CLINIC | Age: 74
End: 2024-02-15

## 2024-02-15 VITALS
OXYGEN SATURATION: 94 % | SYSTOLIC BLOOD PRESSURE: 142 MMHG | HEIGHT: 60 IN | TEMPERATURE: 97.5 F | WEIGHT: 163.8 LBS | HEART RATE: 82 BPM | BODY MASS INDEX: 32.16 KG/M2 | DIASTOLIC BLOOD PRESSURE: 82 MMHG | RESPIRATION RATE: 16 BRPM

## 2024-02-15 DIAGNOSIS — E83.42 HYPOMAGNESEMIA: ICD-10-CM

## 2024-02-15 DIAGNOSIS — E87.1 HYPONATREMIA: Primary | ICD-10-CM

## 2024-02-15 DIAGNOSIS — I10 PRIMARY HYPERTENSION: ICD-10-CM

## 2024-02-15 PROCEDURE — 4004F PT TOBACCO SCREEN RCVD TLK: CPT | Performed by: INTERNAL MEDICINE

## 2024-02-15 PROCEDURE — 1159F MED LIST DOCD IN RCRD: CPT | Performed by: INTERNAL MEDICINE

## 2024-02-15 PROCEDURE — 99215 OFFICE O/P EST HI 40 MIN: CPT | Performed by: INTERNAL MEDICINE

## 2024-02-15 PROCEDURE — 3078F DIAST BP <80 MM HG: CPT | Performed by: INTERNAL MEDICINE

## 2024-02-15 PROCEDURE — 4010F ACE/ARB THERAPY RXD/TAKEN: CPT | Performed by: INTERNAL MEDICINE

## 2024-02-15 PROCEDURE — 3074F SYST BP LT 130 MM HG: CPT | Performed by: INTERNAL MEDICINE

## 2024-02-15 PROCEDURE — 1160F RVW MEDS BY RX/DR IN RCRD: CPT | Performed by: INTERNAL MEDICINE

## 2024-02-15 PROCEDURE — 3048F LDL-C <100 MG/DL: CPT | Performed by: INTERNAL MEDICINE

## 2024-02-15 PROCEDURE — 3044F HG A1C LEVEL LT 7.0%: CPT | Performed by: INTERNAL MEDICINE

## 2024-02-15 PROCEDURE — 3060F POS MICROALBUMINURIA REV: CPT | Performed by: INTERNAL MEDICINE

## 2024-02-15 PROCEDURE — 1126F AMNT PAIN NOTED NONE PRSNT: CPT | Performed by: INTERNAL MEDICINE

## 2024-02-15 RX ORDER — BACLOFEN 20 MG
500 TABLET ORAL DAILY
Qty: 90 TABLET | Refills: 3 | Status: SHIPPED | OUTPATIENT
Start: 2024-02-15 | End: 2025-02-14

## 2024-02-15 RX ORDER — HYDROCHLOROTHIAZIDE 50 MG/1
25 TABLET ORAL DAILY
Qty: 45 TABLET | Refills: 3 | Status: SHIPPED | OUTPATIENT
Start: 2024-02-15 | End: 2024-04-01 | Stop reason: ALTCHOICE

## 2024-02-15 NOTE — PATIENT INSTRUCTIONS
Dear CHARLOTTE   It was nice seeing you in the nephrology clinic today     Today we discussed the following:     #Low sodium in blood-possibly related to hydrochlorothiazide/water pill use  -We will drop hydrochlorothiazide into half (25 mg daily)-watch for leg swelling  -Please increase your protein intake, milkshakes, ice cream, chicken, meat  -Limit fluid intake not to exceed 50 ounces daily     #Avoid using Advil/Aleve/ibuprofen every day because this medication can damage your kidneys     # Recent cat scratch-I recommend you speak to Dr. Sagastume to see if you need any prophylactic shots or antibiotics    # Low magnesium-continue magnesium supplements.  Will monitor magnesium with next blood work     Follow-up in 6 months with repeat blood work and urinalysis prior to next visit     Please call our office if you have any question  Thank you for coming to see me today     Sravan Mcdonald MD, MS, LEIDY DONALDSON  Clinical  - [employer]  Nephrologist - [facility]

## 2024-02-15 NOTE — TELEPHONE ENCOUNTER
Pt is calling about a cat scratch resulting in a bruise on her the top of her R Hand from Friday. She states her kidney doctor saw it and told her to contact her pcp eval and treatment. I suggested for her to go to urgent care for an examination. Pt is denying to go to urgent care for evaluation. She states he's fine and has no fever.

## 2024-02-15 NOTE — PROGRESS NOTES
Subjective       Aisha Alcantar is a 73 y.o. female who has past medical history of hypertension, COPD, active smoker, type 2 diabetes-well-controlled, fibromyalgia, significant NSAID use, aortic stenosis who is coming to see me today for hyponatremia follow-up    Last office visit August 2023.  We discussed high protein intake and limiting fluid intake.  Aisha came alone today.  She apparently not aware about our instructions and she does not recall talking about hyponatremia last office visit.  She had recent cat scratch in her hand-encouraged to talk to her PCP regarding.  She continues to have hypomagnesemia-currently on magnesium supplements which is causing some stomach upset.  Encouraged to continue magnesium as possible.  Repeat blood work showed improved serum sodium up to 134 which is back to her baseline.  Kidney function is normal.  Blood pressure is accepted.  We discussed possibly dropping the water pill dose to address the hyponatremia/hypomagnesemia issues.  Currently no leg swelling noticed    Prior notes  Aisha came alone today. She is aware of history of hyponatremia but she was recently instructed to see nephrology. She reports occasional headache almost daily that she takes NSAID for. She admitted sometimes she feels confused and lethargic. Her diet is balanced. She does not drink water but drink enough fluids including coffee tea juice. She consumes protein in her diet. She takes hydrochlorothiazide for hypertension management. She thinks her leg will get swollen if we discontinue her hydrochlorothiazide. Blood pressure is accepted. Kidney function is excellent. Counseled against daily NSAID use. She is just recovering from recent cataract surgery     Social history: Active smoker little more than 2 packs/day, interested in quitting  Surgical history: Irrelevant  Family history: Relevant          Objective   /82 (BP Location: Right arm, Patient Position: Standing, BP Cuff Size: Large  adult)   Pulse 82   Temp 36.4 °C (97.5 °F)   Resp 16   Ht 1.524 m (5')   Wt 74.3 kg (163 lb 12.8 oz)   SpO2 94%   BMI 31.99 kg/m²   Wt Readings from Last 3 Encounters:   02/15/24 74.3 kg (163 lb 12.8 oz)   02/07/24 74.6 kg (164 lb 6.4 oz)   10/18/23 73.9 kg (163 lb)       Physical Exam    General appearance: no distress awake and alert on room air, euvolemic on exam  Eyes: non-icteric  HEENT: atrumatic head, PEERLA, moist mucosa  Skin: no apparent rash  Heart: NSR, S1, S2 normal, no murmur or gallop  Lungs: Symmetrical expansion,CTA bilat no wheezing/crackles  Abdomen: soft, nt/nd, obese  Extremities: no significant edema bilat.  Left leg is bigger than right  Neuro: No FND,asterixis, no focal deficits noticed        Review of Systems     Constitutional: no fever, no chills, no recent weight gain and no recent weight loss.   Eyes: no blurred vision and no diplopia.   ENT: no hearing loss, no earache, no sore throat, no swollen glands in the neck and no nasal discharge.   Cardiovascular: no chest pain, no palpitations and no lower extremity edema.   Respiratory: no shortness of breath, no chronic cough and no shortness of breath during exertion.   Gastrointestinal: no abdominal pain, no constipation, no heartburn, no vomiting, no bloody stools and no change in bowel movements.   Genitourinary frequent urination at night  Musculoskeletal back pain  Skin: no rashes and no skin lesions.   Neurological: no headaches and no dizziness.   Psychiatric: no confusion, no depression and no anxiety.   Endocrine: no heat intolerance, no cold intolerance, appetite not increased, no thyroid disorder, no increased urinary frequency and no dry skin.   Hematologic/Lymphatic: does not bleed easily and does not bruise easily.   All other systems have been reviewed and are negative for complaint.         Data Review                   Lab Results   Component Value Date    URICACID 4.1 08/09/2023           Lab Results   Component  "Value Date    HGBA1C 6.5 (H) 01/22/2024                 No lab exists for component: \"CR\", \"PHOSPHORUS\"        Albumin/Creatine Ratio   Date Value Ref Range Status   02/01/2023 19.3 0.0 - 30.0 ug/mg crt Final   10/14/2021 59.6 (H) 0.0 - 30.0 ug/mg crt Final   08/12/2020 SEE COMMENT 0.0 - 30.0 ug/mg crt Final     Comment:     One or more analytes used in this calculation   is outside of the analytical measurement range.  Calculation cannot be performed.              RFP  Recent Labs     01/22/24  1144 08/09/23  1033 05/04/23  1040 02/01/23  1040 08/11/22  0956 02/10/22  0923 12/15/21  1500   * 137 132* 134* 134* 134* 132*   K 4.6 4.2 4.5 4.8 5.0 4.5 3.5   CL 90* 95* 91* 92* 92* 94* 94*   CO2 31 32 33* 35* 32 32 30   BUN 11 11 11 11 12 10 12   CREATININE 0.63 0.71 0.67 0.63 0.65 0.71 0.61   GLUCOSE 134* 135* 122* 140* 119* 138* 96   CALCIUM 9.1 9.4 9.5 9.3 9.9 9.5 8.9   PHOS 4.0 4.2  --   --   --   --   --    EGFR >90  --   --   --   --   --   --    ANIONGAP 18 14 13 12 15 13 12        Urineanalysis  No results for input(s): \"COLORU\", \"APPEARANCEU\", \"SPECGRAVU\", \"OSIRIS\", \"PROTUR\", \"GLUCOSEU\", \"BLOODU\", \"KETONESU\", \"BILIRUBINU\", \"NITRITEU\", \"LEUKOCYTESU\" in the last 69403 hours.    No lab exists for component: \"UROBILOGEN\"    Urine Electrolytes  Recent Labs     01/22/24  1144 08/08/23  1602 02/01/23  1040 10/14/21  1413 08/12/20  1802 05/06/20  0913 02/20/20  0900 03/07/18  1218   SODIUMURR 49 62  --   --   --   --   --   --    NACREATUR 23 39  --   --   --   --   --   --    KUR 93 115  --   --   --   --   --   --    KCREATUR 44 72  --   --   --   --   --   --    CREATU 213.1 160.0 176.0 190.0 21.4 124.0 95.5 182.0   MICROALBCREA  --   --  19.3 59.6* SEE COMMENT 112.7* 55.6* 20.6        Urine Micro  No results for input(s): \"WBCU\", \"RBCU\", \"HYALCASTU\", \"SQUAMEPIU\", \"BACTERIAU\", \"MUCUSU\" in the last 51327 hours.     Iron  Recent Labs     02/01/23  1040 08/11/22  0956 12/15/21  1500 03/08/21  0841 12/08/20  1005 " 05/06/20  0914 02/20/20  0900 06/10/19  0000 03/11/19  1045 01/08/19  1433   IRON 89 72 50 59 81 74 80 70 64 44   TIBC 392 421 378 422 396 433 416 412 412 395   IRONSAT 23* 17* 13* 14* 20* 17* 19* 17* 16* 11*   FERRITIN 29 22 26 26 40 21 18  --   --  30          Current Outpatient Medications on File Prior to Visit   Medication Sig Dispense Refill    albuterol 90 mcg/actuation inhaler USE 2 INHALATIONS EVERY 4 TO 6 HOURS AS NEEDED 17 g 4    amLODIPine (Norvasc) 10 mg tablet Take 1 tablet (10 mg) by mouth once daily. 90 tablet 3    aspirin 81 mg chewable tablet Chew 1 tablet (81 mg) once daily.      atorvastatin (Lipitor) 40 mg tablet Take 1 tablet (40 mg) by mouth once daily at bedtime. 90 tablet 3    cholecalciferol (Vitamin D-3) 10 MCG (400 UNIT) tablet Take 1 tablet (10 mcg) by mouth once daily.      fluticasone (Flonase) 50 mcg/actuation nasal spray Administer 2 sprays into each nostril once daily. 16 g 2    hydroCHLOROthiazide (HYDRODiuril) 50 mg tablet TAKE 1 TABLET DAILY 60 tablet 5    lansoprazole (Prevacid) 30 mg DR capsule Take 1 capsule (30 mg) by mouth once daily in the morning. Take before meals. 90 capsule 1    losartan (Cozaar) 100 mg tablet TAKE 1 TABLET DAILY 90 tablet 1    magnesium oxide 500 mg tablet Take 1 tablet (500 mg) by mouth once daily. 30 tablet 11    metFORMIN XR (Glucophage-XR) 500 mg 24 hr tablet Take 2 tablets in the AM, and 1 tablet  tablet 3    walker (Ultra-Light Rollator) misc Rollator - use daily  Dx: OA, lumbar spondylosis 1 each 0     No current facility-administered medications on file prior to visit.           Assessment and Plan       Aisha Alcantar  is a 73 y.o. female who has past medical history of   hypertension, COPD, active smoker, type 2 diabetes-well-controlled, fibromyalgia, significant NSAID use, aortic stenosis who is coming to see me today for hyponatremia/hypomagnesemia follow-up     Impression   # Hypoosmolar hyponatremia : most likely related to  hydrochlorothiazide use.  We will drop hydrochlorothiazide from 50 down to 25 mg.  Instructed to watch leg swelling  -Mildly asymptomatic  - Base line Serum Na : 130-135 since 2019 with no changes recently  - Most recent Serum Na: 134, serum osmolarity 278  - Volume status: Euvolemic  - Other lab work : We will check lipid profile xx, TSH xx, plasma protein xx, uric acid xx, Mg-continue magnesium supplements  - Urine studies: Urine sodium 49, potassium 93-?  SIADH related to smoking  - Urine dipstick today was elevated specific gravity 1025 indicating no polydipsia  - Drugs: Diuretics, not on SSRIs  - ETOH consumption not significant    # Significant hypomagnesemia-currently on supplements.  Will monitor magnesium.  Will drop hydrochlorothiazide from 50 down to 25 mg     Follow-up in 6 months with repeat blood work and urinalysis prior to visit        Sravan Mcdonald MD, MS, LEIDY DONALDSON  Clinical  - [facility] [employer]  Nephrologist - [facility] - [facility]

## 2024-02-16 NOTE — TELEPHONE ENCOUNTER
Patient informed to go to UC, she has swelling and discoloration and her arm hurt lastnight. Patient will go to urgent care today.

## 2024-03-25 DIAGNOSIS — I10 PRIMARY HYPERTENSION: ICD-10-CM

## 2024-03-25 DIAGNOSIS — E78.5 HYPERLIPIDEMIA, UNSPECIFIED HYPERLIPIDEMIA TYPE: ICD-10-CM

## 2024-03-25 DIAGNOSIS — J44.9 CHRONIC OBSTRUCTIVE PULMONARY DISEASE, UNSPECIFIED COPD TYPE (MULTI): Primary | ICD-10-CM

## 2024-03-25 DIAGNOSIS — E11.49 CONTROLLED TYPE 2 DIABETES MELLITUS WITH OTHER NEUROLOGIC COMPLICATION, WITHOUT LONG-TERM CURRENT USE OF INSULIN (MULTI): ICD-10-CM

## 2024-03-25 RX ORDER — FLUTICASONE FUROATE, UMECLIDINIUM BROMIDE AND VILANTEROL TRIFENATATE 100; 62.5; 25 UG/1; UG/1; UG/1
1 POWDER RESPIRATORY (INHALATION) DAILY
Qty: 1 EACH | Refills: 3 | Status: SHIPPED | OUTPATIENT
Start: 2024-03-25 | End: 2024-04-02 | Stop reason: SDUPTHER

## 2024-03-25 RX ORDER — LOSARTAN POTASSIUM 100 MG/1
100 TABLET ORAL DAILY
Qty: 90 TABLET | Refills: 1 | Status: SHIPPED | OUTPATIENT
Start: 2024-03-25

## 2024-03-25 RX ORDER — ATORVASTATIN CALCIUM 40 MG/1
40 TABLET, FILM COATED ORAL NIGHTLY
Qty: 90 TABLET | Refills: 3 | Status: SHIPPED | OUTPATIENT
Start: 2024-03-25

## 2024-03-25 RX ORDER — FLUTICASONE FUROATE, UMECLIDINIUM BROMIDE AND VILANTEROL TRIFENATATE 100; 62.5; 25 UG/1; UG/1; UG/1
1 POWDER RESPIRATORY (INHALATION)
COMMUNITY
End: 2024-03-25 | Stop reason: SDUPTHER

## 2024-03-25 RX ORDER — METFORMIN HYDROCHLORIDE 500 MG/1
TABLET, EXTENDED RELEASE ORAL
Qty: 270 TABLET | Refills: 3 | Status: SHIPPED | OUTPATIENT
Start: 2024-03-25

## 2024-03-25 RX ORDER — AMLODIPINE BESYLATE 10 MG/1
10 TABLET ORAL DAILY
Qty: 90 TABLET | Refills: 3 | Status: SHIPPED | OUTPATIENT
Start: 2024-03-25 | End: 2025-03-25

## 2024-03-25 NOTE — TELEPHONE ENCOUNTER
amLODIPine (Norvasc) 10 mg tablet Take 1 tablet (10 mg) by mouth once daily.     losartan (Cozaar) 100 mg tablet TAKE 1 TABLET DAILY     amLODIPine (Norvasc) 10 mg tablet Take 1 tablet (10 mg) by mouth once daily.     atorvastatin (Lipitor) 40 mg tablet Take 1 tablet (40 mg) by mouth once daily at bedtime.     metFORMIN XR (Glucophage-XR) 500 mg 24 hr tablet Take 2 tablets in the AM, and 1 tablet PM     Cristel Lehman  mcg-   patient said she was on this from another doctor, but he moved. She had mentioned it to DR Sagastume, and has been taking it for one month, and she feels much better.    [facility]

## 2024-03-28 ENCOUNTER — TELEPHONE (OUTPATIENT)
Dept: NEPHROLOGY | Facility: CLINIC | Age: 74
End: 2024-03-28
Payer: MEDICARE

## 2024-04-01 DIAGNOSIS — R60.0 BILATERAL LEG EDEMA: Primary | ICD-10-CM

## 2024-04-01 RX ORDER — FUROSEMIDE 40 MG/1
40 TABLET ORAL DAILY
Qty: 30 TABLET | Refills: 11 | Status: SHIPPED | OUTPATIENT
Start: 2024-04-01 | End: 2024-04-22 | Stop reason: SDUPTHER

## 2024-04-02 ENCOUNTER — TELEPHONE (OUTPATIENT)
Dept: PRIMARY CARE | Facility: CLINIC | Age: 74
End: 2024-04-02
Payer: MEDICARE

## 2024-04-02 DIAGNOSIS — J44.9 CHRONIC OBSTRUCTIVE PULMONARY DISEASE, UNSPECIFIED COPD TYPE (MULTI): ICD-10-CM

## 2024-04-02 RX ORDER — FLUTICASONE FUROATE, UMECLIDINIUM BROMIDE AND VILANTEROL TRIFENATATE 100; 62.5; 25 UG/1; UG/1; UG/1
1 POWDER RESPIRATORY (INHALATION) DAILY
Qty: 90 EACH | Refills: 3 | Status: SHIPPED | OUTPATIENT
Start: 2024-04-02 | End: 2025-04-02

## 2024-04-02 NOTE — TELEPHONE ENCOUNTER
Patient called and states that she needs to have a 3 mo supply of the of the Trilegy she states that she just got a 1 mo and it cost her $100 and 3 mo supply cost her $200 so it is cheaper for a 3 mo. She states  that she just got her supply for this month but it was only a month supply. [facility] day supply.  pl

## 2024-04-18 ENCOUNTER — TELEPHONE (OUTPATIENT)
Dept: PRIMARY CARE | Facility: CLINIC | Age: 74
End: 2024-04-18
Payer: MEDICARE

## 2024-04-18 NOTE — TELEPHONE ENCOUNTER
Aisha called as she has stomach ulcers. She had noticed last night into today that she has been having stomach pain. She is asking about what she needs to do.     She also had tripped over an object and now has a bruise on her RT leg, outside just above the ankle. She has had this for about 8 weeks. She is concern as she still has this. She mentioned that it is not as swollen as before, tender to the tough, not really warm to the tough. She is wanting to make sure that it might be a blood clot.

## 2024-04-19 ENCOUNTER — OFFICE VISIT (OUTPATIENT)
Dept: PRIMARY CARE | Facility: CLINIC | Age: 74
End: 2024-04-19
Payer: MEDICARE

## 2024-04-19 VITALS
SYSTOLIC BLOOD PRESSURE: 114 MMHG | DIASTOLIC BLOOD PRESSURE: 66 MMHG | BODY MASS INDEX: 31.8 KG/M2 | HEIGHT: 60 IN | HEART RATE: 89 BPM | WEIGHT: 162 LBS | OXYGEN SATURATION: 95 %

## 2024-04-19 DIAGNOSIS — Z87.11 HISTORY OF GASTRIC ULCER: ICD-10-CM

## 2024-04-19 DIAGNOSIS — S80.11XA CONTUSION OF RIGHT LOWER LEG, INITIAL ENCOUNTER: Primary | ICD-10-CM

## 2024-04-19 PROCEDURE — 1159F MED LIST DOCD IN RCRD: CPT | Performed by: FAMILY MEDICINE

## 2024-04-19 PROCEDURE — 3060F POS MICROALBUMINURIA REV: CPT | Performed by: FAMILY MEDICINE

## 2024-04-19 PROCEDURE — 1125F AMNT PAIN NOTED PAIN PRSNT: CPT | Performed by: FAMILY MEDICINE

## 2024-04-19 PROCEDURE — 3048F LDL-C <100 MG/DL: CPT | Performed by: FAMILY MEDICINE

## 2024-04-19 PROCEDURE — 99213 OFFICE O/P EST LOW 20 MIN: CPT | Performed by: FAMILY MEDICINE

## 2024-04-19 PROCEDURE — 4010F ACE/ARB THERAPY RXD/TAKEN: CPT | Performed by: FAMILY MEDICINE

## 2024-04-19 PROCEDURE — 1160F RVW MEDS BY RX/DR IN RCRD: CPT | Performed by: FAMILY MEDICINE

## 2024-04-19 PROCEDURE — G2211 COMPLEX E/M VISIT ADD ON: HCPCS | Performed by: FAMILY MEDICINE

## 2024-04-19 PROCEDURE — 3074F SYST BP LT 130 MM HG: CPT | Performed by: FAMILY MEDICINE

## 2024-04-19 PROCEDURE — 3078F DIAST BP <80 MM HG: CPT | Performed by: FAMILY MEDICINE

## 2024-04-19 PROCEDURE — 3044F HG A1C LEVEL LT 7.0%: CPT | Performed by: FAMILY MEDICINE

## 2024-04-19 RX ORDER — SUCRALFATE 1 G/10ML
1 SUSPENSION ORAL 4 TIMES DAILY
Qty: 600 ML | Refills: 1 | Status: SHIPPED | OUTPATIENT
Start: 2024-04-19 | End: 2024-04-19

## 2024-04-19 RX ORDER — SUCRALFATE 1 G/1
1 TABLET ORAL
Qty: 120 TABLET | Refills: 1 | Status: SHIPPED | OUTPATIENT
Start: 2024-04-19 | End: 2025-04-19

## 2024-04-19 RX ORDER — LIDOCAINE 40 MG/G
CREAM TOPICAL 3 TIMES DAILY PRN
Qty: 30 G | Refills: 1 | Status: SHIPPED | OUTPATIENT
Start: 2024-04-19 | End: 2025-04-19

## 2024-04-19 ASSESSMENT — PATIENT HEALTH QUESTIONNAIRE - PHQ9
SUM OF ALL RESPONSES TO PHQ9 QUESTIONS 1 AND 2: 0
1. LITTLE INTEREST OR PLEASURE IN DOING THINGS: NOT AT ALL
2. FEELING DOWN, DEPRESSED OR HOPELESS: NOT AT ALL

## 2024-04-19 ASSESSMENT — COLUMBIA-SUICIDE SEVERITY RATING SCALE - C-SSRS
2. HAVE YOU ACTUALLY HAD ANY THOUGHTS OF KILLING YOURSELF?: NO
1. IN THE PAST MONTH, HAVE YOU WISHED YOU WERE DEAD OR WISHED YOU COULD GO TO SLEEP AND NOT WAKE UP?: NO
6. HAVE YOU EVER DONE ANYTHING, STARTED TO DO ANYTHING, OR PREPARED TO DO ANYTHING TO END YOUR LIFE?: NO

## 2024-04-19 ASSESSMENT — PAIN SCALES - GENERAL: PAINLEVEL: 2

## 2024-04-19 ASSESSMENT — ENCOUNTER SYMPTOMS: ABDOMINAL PAIN: 1

## 2024-04-19 NOTE — PROGRESS NOTES
Subjective   Patient ID: Aisha Alcantar is a 73 y.o. female who presents for Abdominal Pain and right leg pain (Tripped over box 8 weeks ago).    Pt is here for right leg pain - 8 weeks ago.  Pt uses a rollator.  Pt ran into plastic file crate.  Pt injured her right lower leg.  Patient has dark leatha on right leg.  Patient is tender over the leatha still - firm.  Pt is concerned there is a clot associated.  No calf pain.  No swelling in leg.  Unable to take NSAIDs due to kidneys and history of ulcers.     Abdominal Pain:  -Symptoms: pt was having epigastric pain.  Comes and goes.  Hx of gastric ulcer. Pt was on carafate in past.   -Duration:  off and on for few weeks.   -Fever:  -Blood in stool: none  -Urinary Symptoms: none  -Treatment Tried: prevacid      Abdominal Pain         Review of Systems   Gastrointestinal:  Positive for abdominal pain.       Objective   /66 (BP Location: Left arm, Patient Position: Sitting, BP Cuff Size: Small adult) Comment (BP Location): fore arm  Pulse 89   Ht 1.524 m (5')   Wt 73.5 kg (162 lb)   SpO2 95%   BMI 31.64 kg/m²     Physical Exam  Vitals reviewed.   Constitutional:       General: She is not in acute distress.  Cardiovascular:      Rate and Rhythm: Normal rate and regular rhythm.      Heart sounds: Murmur heard.      Systolic murmur is present with a grade of 2/6.   Pulmonary:      Effort: Pulmonary effort is normal.      Breath sounds: No wheezing or rhonchi.   Abdominal:      General: Abdomen is flat. There is no distension.      Tenderness: There is no abdominal tenderness. There is no guarding.   Musculoskeletal:      Right lower leg: No edema.      Left lower leg: No edema.      Comments: Using rollator  Right lower leg - tender, area of bruising mid lower leg with mild swelling; calf soft, no tenderness   Lymphadenopathy:      Cervical: No cervical adenopathy.   Neurological:      Mental Status: She is alert.         Assessment/Plan   Diagnoses and all orders  for this visit:  Contusion of right lower leg, initial encounter  -     lidocaine (LMX 4) 4 % cream; Apply topically 3 times a day as needed for mild pain (1 - 3).  History of gastric ulcer  -     sucralfate (Carafate) 1 gram tablet; Take 1 tablet (1 g) by mouth 4 times a day before meals.    Patient Instructions   Pt has contusion of leg - no evidence of blood clot.  Recommend warm compress, lidocaine to help with pain.     For history of gastric ulcer - continue prevacid.  Add carafate for 1-2 weeks for pain.

## 2024-04-19 NOTE — PATIENT INSTRUCTIONS
Pt has contusion of leg - no evidence of blood clot.  Recommend warm compress, lidocaine to help with pain.     For history of gastric ulcer - continue prevacid.  Add carafate for 1-2 weeks for pain.

## 2024-04-22 DIAGNOSIS — R60.0 BILATERAL LEG EDEMA: ICD-10-CM

## 2024-04-24 RX ORDER — FUROSEMIDE 40 MG/1
40 TABLET ORAL DAILY
Qty: 90 TABLET | Refills: 3 | Status: SHIPPED | OUTPATIENT
Start: 2024-04-24 | End: 2025-04-24

## 2024-05-24 ENCOUNTER — APPOINTMENT (OUTPATIENT)
Dept: PRIMARY CARE | Facility: CLINIC | Age: 74
End: 2024-05-24
Payer: MEDICARE

## 2024-08-08 ENCOUNTER — TELEPHONE (OUTPATIENT)
Dept: PRIMARY CARE | Facility: CLINIC | Age: 74
End: 2024-08-08

## 2024-08-08 ENCOUNTER — LAB (OUTPATIENT)
Dept: LAB | Facility: LAB | Age: 74
End: 2024-08-08
Payer: MEDICARE

## 2024-08-08 DIAGNOSIS — E11.49 CONTROLLED TYPE 2 DIABETES MELLITUS WITH OTHER NEUROLOGIC COMPLICATION, WITHOUT LONG-TERM CURRENT USE OF INSULIN (MULTI): ICD-10-CM

## 2024-08-08 DIAGNOSIS — E11.49 CONTROLLED TYPE 2 DIABETES MELLITUS WITH OTHER NEUROLOGIC COMPLICATION, WITHOUT LONG-TERM CURRENT USE OF INSULIN (MULTI): Primary | ICD-10-CM

## 2024-08-08 DIAGNOSIS — E83.42 HYPOMAGNESEMIA: ICD-10-CM

## 2024-08-08 DIAGNOSIS — I10 PRIMARY HYPERTENSION: ICD-10-CM

## 2024-08-08 DIAGNOSIS — E87.1 HYPONATREMIA: ICD-10-CM

## 2024-08-08 LAB
ALBUMIN SERPL BCP-MCNC: 4.7 G/DL (ref 3.4–5)
ANION GAP SERPL CALC-SCNC: 14 MMOL/L (ref 10–20)
BUN SERPL-MCNC: 12 MG/DL (ref 6–23)
CALCIUM SERPL-MCNC: 9.3 MG/DL (ref 8.6–10.3)
CHLORIDE SERPL-SCNC: 101 MMOL/L (ref 98–107)
CHLORIDE UR-SCNC: 26 MMOL/L
CHLORIDE/CREATININE (MMOL/G) IN URINE: 10 MMOL/G CREAT (ref 38–318)
CHOLEST SERPL-MCNC: 128 MG/DL (ref 0–199)
CHOLESTEROL/HDL RATIO: 1.9
CO2 SERPL-SCNC: 31 MMOL/L (ref 21–32)
CREAT SERPL-MCNC: 0.73 MG/DL (ref 0.5–1.05)
CREAT UR-MCNC: 270.8 MG/DL (ref 20–320)
EGFRCR SERPLBLD CKD-EPI 2021: 86 ML/MIN/1.73M*2
EST. AVERAGE GLUCOSE BLD GHB EST-MCNC: 140 MG/DL
GLUCOSE SERPL-MCNC: 136 MG/DL (ref 74–99)
HBA1C MFR BLD: 6.5 %
HDLC SERPL-MCNC: 66.1 MG/DL
MAGNESIUM SERPL-MCNC: 1.75 MG/DL (ref 1.6–2.4)
NON-HDL CHOLESTEROL: 62 MG/DL (ref 0–149)
OSMOLALITY SERPL: 296 MOSM/KG (ref 280–300)
OSMOLALITY UR: 555 MOSM/KG (ref 200–1200)
PHOSPHATE SERPL-MCNC: 5 MG/DL (ref 2.5–4.9)
POTASSIUM SERPL-SCNC: 4.7 MMOL/L (ref 3.5–5.3)
POTASSIUM UR-SCNC: 101 MMOL/L
POTASSIUM/CREAT UR-RTO: 37 MMOL/G CREAT
SODIUM SERPL-SCNC: 141 MMOL/L (ref 136–145)
SODIUM UR-SCNC: 38 MMOL/L
SODIUM/CREAT UR-RTO: 14 MMOL/G CREAT
TSH SERPL-ACNC: 1.13 MIU/L (ref 0.44–3.98)

## 2024-08-08 PROCEDURE — 36415 COLL VENOUS BLD VENIPUNCTURE: CPT

## 2024-08-08 PROCEDURE — 84133 ASSAY OF URINE POTASSIUM: CPT

## 2024-08-08 PROCEDURE — 80069 RENAL FUNCTION PANEL: CPT

## 2024-08-08 PROCEDURE — 84443 ASSAY THYROID STIM HORMONE: CPT

## 2024-08-08 PROCEDURE — 83735 ASSAY OF MAGNESIUM: CPT

## 2024-08-08 PROCEDURE — 83930 ASSAY OF BLOOD OSMOLALITY: CPT

## 2024-08-08 PROCEDURE — 82570 ASSAY OF URINE CREATININE: CPT

## 2024-08-08 PROCEDURE — 82436 ASSAY OF URINE CHLORIDE: CPT

## 2024-08-08 PROCEDURE — 83935 ASSAY OF URINE OSMOLALITY: CPT

## 2024-08-08 PROCEDURE — 83718 ASSAY OF LIPOPROTEIN: CPT

## 2024-08-08 PROCEDURE — 84300 ASSAY OF URINE SODIUM: CPT

## 2024-08-08 PROCEDURE — 83036 HEMOGLOBIN GLYCOSYLATED A1C: CPT

## 2024-08-08 PROCEDURE — 82465 ASSAY BLD/SERUM CHOLESTEROL: CPT

## 2024-08-15 ENCOUNTER — APPOINTMENT (OUTPATIENT)
Dept: NEPHROLOGY | Facility: CLINIC | Age: 74
End: 2024-08-15
Payer: MEDICARE

## 2024-08-15 VITALS
BODY MASS INDEX: 31.73 KG/M2 | DIASTOLIC BLOOD PRESSURE: 98 MMHG | HEART RATE: 79 BPM | RESPIRATION RATE: 16 BRPM | TEMPERATURE: 97.1 F | OXYGEN SATURATION: 98 % | HEIGHT: 60 IN | SYSTOLIC BLOOD PRESSURE: 185 MMHG | WEIGHT: 161.6 LBS

## 2024-08-15 DIAGNOSIS — E87.1 HYPONATREMIA: Primary | ICD-10-CM

## 2024-08-15 DIAGNOSIS — M79.89 LEG SWELLING: ICD-10-CM

## 2024-08-15 DIAGNOSIS — E83.42 HYPOMAGNESEMIA: ICD-10-CM

## 2024-08-15 DIAGNOSIS — I10 PRIMARY HYPERTENSION: ICD-10-CM

## 2024-08-15 PROCEDURE — 1159F MED LIST DOCD IN RCRD: CPT | Performed by: INTERNAL MEDICINE

## 2024-08-15 PROCEDURE — 99214 OFFICE O/P EST MOD 30 MIN: CPT | Performed by: INTERNAL MEDICINE

## 2024-08-15 PROCEDURE — 3075F SYST BP GE 130 - 139MM HG: CPT | Performed by: INTERNAL MEDICINE

## 2024-08-15 PROCEDURE — 3048F LDL-C <100 MG/DL: CPT | Performed by: INTERNAL MEDICINE

## 2024-08-15 PROCEDURE — 4010F ACE/ARB THERAPY RXD/TAKEN: CPT | Performed by: INTERNAL MEDICINE

## 2024-08-15 PROCEDURE — 3060F POS MICROALBUMINURIA REV: CPT | Performed by: INTERNAL MEDICINE

## 2024-08-15 PROCEDURE — 3044F HG A1C LEVEL LT 7.0%: CPT | Performed by: INTERNAL MEDICINE

## 2024-08-15 PROCEDURE — 3078F DIAST BP <80 MM HG: CPT | Performed by: INTERNAL MEDICINE

## 2024-08-15 NOTE — PROGRESS NOTES
Subjective     Aisha Alcantar is a 73 y.o. female who has past medical history of hypertension, COPD, active smoker, type 2 diabetes-well-controlled, fibromyalgia, significant NSAID use, aortic stenosis who is coming to see me today for hyponatremia follow-up    Aisha came in today with good blood pressure while sitting. Currently on Lasix 40mg and 500mg magnesium daily. Has been increasing her protein intake as well. Discussed limiting soda to x1 per day. Drinks coffee in the morning, now 1.5 cups a day. Over the last week, noted some abdominal pain with L sided spasms. Alters between diarrhea and constipation. States that she has a history of IBS. No kidney related concerns today.       Feb 2024:  Last office visit August 2023.  We discussed high protein intake and limiting fluid intake.  Aisha came alone today.  She apparently not aware about our instructions and she does not recall talking about hyponatremia last office visit.  She had recent cat scratch in her hand-encouraged to talk to her PCP regarding.  She continues to have hypomagnesemia-currently on magnesium supplements which is causing some stomach upset.  Encouraged to continue magnesium as possible.  Repeat blood work showed improved serum sodium up to 134 which is back to her baseline.  Kidney function is normal.  Blood pressure is accepted.  We discussed possibly dropping the water pill dose to address the hyponatremia/hypomagnesemia issues.  Currently no leg swelling noticed    Prior notes  Aisha came alone today. She is aware of history of hyponatremia but she was recently instructed to see nephrology. She reports occasional headache almost daily that she takes NSAID for. She admitted sometimes she feels confused and lethargic. Her diet is balanced. She does not drink water but drink enough fluids including coffee tea juice. She consumes protein in her diet. She takes hydrochlorothiazide for hypertension management. She thinks her leg will get  swollen if we discontinue her hydrochlorothiazide. Blood pressure is accepted. Kidney function is excellent. Counseled against daily NSAID use. She is just recovering from recent cataract surgery     Social history: Active smoker little more than 2 packs/day, interested in quitting  Surgical history: Irrelevant  Family history: Relevant          Objective   BP (!) 185/98 (BP Location: Left arm, Patient Position: Standing, BP Cuff Size: Adult)   Pulse 79   Temp 36.2 °C (97.1 °F)   Resp 16   Ht 1.524 m (5')   Wt 73.3 kg (161 lb 9.6 oz)   SpO2 98%   BMI 31.56 kg/m²   Wt Readings from Last 3 Encounters:   08/15/24 73.3 kg (161 lb 9.6 oz)   04/19/24 73.5 kg (162 lb)   02/15/24 74.3 kg (163 lb 12.8 oz)       Physical Exam    General appearance: no distress awake and alert on room air, euvolemic on exam  Eyes: non-icteric  HEENT: atrumatic head, PEERLA, moist mucosa  Skin: no apparent rash  Heart: NSR, S1, S2 normal, no murmur or gallop  Lungs: Symmetrical expansion,CTA bilat no wheezing/crackles  Abdomen: soft, nt/nd, obese  Extremities: no significant edema bilat.   Neuro: No FND,asterixis, no focal deficits noticed        Review of Systems     Constitutional: no fever, no chills, no recent weight gain and no recent weight loss.   Eyes: no blurred vision and no diplopia.   ENT: no hearing loss, no earache, no sore throat, no swollen glands in the neck and no nasal discharge.   Cardiovascular: no chest pain, no palpitations and no lower extremity edema.   Respiratory: no shortness of breath, no chronic cough and no shortness of breath during exertion.   Gastrointestinal: no abdominal pain, no constipation, no heartburn, no vomiting, no bloody stool, constipation  Genitourinary: frequent urination at night  Musculoskeletal: back pain  Skin: no rashes and no skin lesions.   Neurological: no headaches and no dizziness.   Psychiatric: no confusion, no depression and no anxiety.   Endocrine: no heat intolerance, no cold  "intolerance, appetite not increased, no thyroid disorder, no increased urinary frequency and no dry skin.   Hematologic/Lymphatic: does not bleed easily and does not bruise easily.   All other systems have been reviewed and are negative for complaint.         Data Review                   Lab Results   Component Value Date    URICACID 4.1 08/09/2023           Lab Results   Component Value Date    HGBA1C 6.5 (H) 08/08/2024                 No lab exists for component: \"CR\", \"PHOSPHORUS\"        Albumin/Creatine Ratio   Date Value Ref Range Status   02/01/2023 19.3 0.0 - 30.0 ug/mg crt Final   10/14/2021 59.6 (H) 0.0 - 30.0 ug/mg crt Final   08/12/2020 SEE COMMENT 0.0 - 30.0 ug/mg crt Final     Comment:     One or more analytes used in this calculation   is outside of the analytical measurement range.  Calculation cannot be performed.              RFP  Recent Labs     08/08/24  1023 01/22/24  1144 08/09/23  1033 05/04/23  1040 02/01/23  1040 08/11/22  0956 02/10/22  0923    134* 137 132* 134* 134* 134*   K 4.7 4.6 4.2 4.5 4.8 5.0 4.5    90* 95* 91* 92* 92* 94*   CO2 31 31 32 33* 35* 32 32   BUN 12 11 11 11 11 12 10   CREATININE 0.73 0.63 0.71 0.67 0.63 0.65 0.71   GLUCOSE 136* 134* 135* 122* 140* 119* 138*   CALCIUM 9.3 9.1 9.4 9.5 9.3 9.9 9.5   PHOS 5.0* 4.0 4.2  --   --   --   --    EGFR 86 >90  --   --   --   --   --    ANIONGAP 14 18 14 13 12 15 13        Urineanalysis  No results for input(s): \"COLORU\", \"APPEARANCEU\", \"SPECGRAVU\", \"OSIRIS\", \"PROTUR\", \"GLUCOSEU\", \"BLOODU\", \"KETONESU\", \"BILIRUBINU\", \"NITRITEU\", \"LEUKOCYTESU\" in the last 90176 hours.    No lab exists for component: \"UROBILOGEN\"    Urine Electrolytes  Recent Labs     08/08/24  1028 01/22/24  1144 08/08/23  1602 02/01/23  1040 10/14/21  1413 08/12/20  1802 05/06/20  0913 02/20/20  0900 03/07/18  1218   SODIUMURR 38 49 62  --   --   --   --   --   --    NACREATUR 14 23 39  --   --   --   --   --   --     93 115  --   --   --   --   --   " "--    KCREATUR 37 44 72  --   --   --   --   --   --    CREATU 270.8 213.1 160.0 176.0 190.0 21.4 124.0 95.5 182.0   MICROALBCREA  --   --   --  19.3 59.6* SEE COMMENT 112.7* 55.6* 20.6        Urine Micro  No results for input(s): \"WBCU\", \"RBCU\", \"HYALCASTU\", \"SQUAMEPIU\", \"BACTERIAU\", \"MUCUSU\" in the last 41143 hours.     Iron  Recent Labs     02/01/23  1040 08/11/22  0956 12/15/21  1500 03/08/21  0841 12/08/20  1005 05/06/20  0914 02/20/20  0900 06/10/19  0000 03/11/19  1045 01/08/19  1433   IRON 89 72 50 59 81 74 80 70 64 44   TIBC 392 421 378 422 396 433 416 412 412 395   IRONSAT 23* 17* 13* 14* 20* 17* 19* 17* 16* 11*   FERRITIN 29 22 26 26 40 21 18  --   --  30          Current Outpatient Medications on File Prior to Visit   Medication Sig Dispense Refill    albuterol 90 mcg/actuation inhaler USE 2 INHALATIONS EVERY 4 TO 6 HOURS AS NEEDED 17 g 4    amLODIPine (Norvasc) 10 mg tablet Take 1 tablet (10 mg) by mouth once daily. 90 tablet 3    aspirin 81 mg chewable tablet Chew 1 tablet (81 mg) once daily.      atorvastatin (Lipitor) 40 mg tablet Take 1 tablet (40 mg) by mouth once daily at bedtime. 90 tablet 3    cholecalciferol (Vitamin D-3) 10 MCG (400 UNIT) tablet Take 1 tablet (10 mcg) by mouth once daily.      fluticasone (Flonase) 50 mcg/actuation nasal spray Administer 2 sprays into each nostril once daily. 16 g 2    fluticasone-umeclidin-vilanter (Trelegy Ellipta) 100-62.5-25 mcg blister with device Inhale 1 puff once daily. 90 each 3    furosemide (Lasix) 40 mg tablet Take 1 tablet (40 mg) by mouth once daily. 90 tablet 3    lansoprazole (Prevacid) 30 mg DR capsule Take 1 capsule (30 mg) by mouth once daily in the morning. Take before meals. 90 capsule 1    lidocaine (LMX 4) 4 % cream Apply topically 3 times a day as needed for mild pain (1 - 3). 30 g 1    losartan (Cozaar) 100 mg tablet Take 1 tablet (100 mg) by mouth once daily. 90 tablet 1    magnesium oxide 500 mg tablet Take 1 tablet (500 mg) by mouth " once daily. 90 tablet 3    metFORMIN  mg 24 hr tablet Take 2 tablets in the AM, and 1 tablet  tablet 3    walker (Ultra-Light Rollator) misc Rollator - use daily  Dx: OA, lumbar spondylosis 1 each 0    sucralfate (Carafate) 1 gram tablet Take 1 tablet (1 g) by mouth 4 times a day before meals. (Patient not taking: Reported on 8/15/2024) 120 tablet 1     No current facility-administered medications on file prior to visit.           Assessment and Plan       Aisha Alcantar  is a 73 y.o. female who has past medical history of   hypertension, COPD, active smoker, type 2 diabetes-well-controlled, fibromyalgia, significant NSAID use, aortic stenosis who is coming to see me today for hyponatremia/hypomagnesemia follow-up     Impression   # Hypoosmolar hyponatremia : most likely related to hydrochlorothiazide use, originally on 50mg. Changed to 25mg, but leg swelling worsened. Currently on Lasix 40mg.  -Mildly asymptomatic  - Base line Serum Na : 130-135 since 2019 with no changes recently  - Most recent Serum Na: 141 serum osmolarity 296  - Volume status: Euvolemic  - Prior Urine studies: Urine sodium 49, potassium 93-?  SIADH related to smoking  -Lipid panel wnl on statins  -Kidney function normal, Scr 0.73 and GFR 86    - Drugs: Diuretics, not on SSRIs  - ETOH consumption not significant    # Significant hypomagnesemia-currently on Mag oxide 500mg    - Mag level now 1.75.      #HTN  -136/77 today, 185/98 when standing  -Amlodipine 10mg      Follow-up in 12 months with repeat blood work and urinalysis prior to visit      EAN Chacon IV, MD, MS, LEIDY DONALDSON  Clinical  - [facility] [employer]  Nephrologist - [facility]

## 2024-08-28 ENCOUNTER — OFFICE VISIT (OUTPATIENT)
Dept: PRIMARY CARE | Facility: CLINIC | Age: 74
End: 2024-08-28
Payer: MEDICARE

## 2024-08-28 VITALS
WEIGHT: 161 LBS | OXYGEN SATURATION: 94 % | SYSTOLIC BLOOD PRESSURE: 124 MMHG | BODY MASS INDEX: 31.44 KG/M2 | HEART RATE: 84 BPM | DIASTOLIC BLOOD PRESSURE: 68 MMHG

## 2024-08-28 DIAGNOSIS — E11.49 TYPE 2 DIABETES MELLITUS WITH OTHER NEUROLOGIC COMPLICATION, WITHOUT LONG-TERM CURRENT USE OF INSULIN (MULTI): ICD-10-CM

## 2024-08-28 DIAGNOSIS — Z00.00 ROUTINE GENERAL MEDICAL EXAMINATION AT HEALTH CARE FACILITY: Primary | ICD-10-CM

## 2024-08-28 DIAGNOSIS — Z87.891 HISTORY OF TOBACCO USE: ICD-10-CM

## 2024-08-28 DIAGNOSIS — I10 PRIMARY HYPERTENSION: ICD-10-CM

## 2024-08-28 DIAGNOSIS — J44.9 CHRONIC OBSTRUCTIVE PULMONARY DISEASE, UNSPECIFIED COPD TYPE (MULTI): ICD-10-CM

## 2024-08-28 DIAGNOSIS — E78.2 MIXED HYPERLIPIDEMIA: ICD-10-CM

## 2024-08-28 PROCEDURE — G0439 PPPS, SUBSEQ VISIT: HCPCS | Performed by: FAMILY MEDICINE

## 2024-08-28 PROCEDURE — 99215 OFFICE O/P EST HI 40 MIN: CPT | Performed by: FAMILY MEDICINE

## 2024-08-28 PROCEDURE — 3074F SYST BP LT 130 MM HG: CPT | Performed by: FAMILY MEDICINE

## 2024-08-28 PROCEDURE — 4004F PT TOBACCO SCREEN RCVD TLK: CPT | Performed by: FAMILY MEDICINE

## 2024-08-28 PROCEDURE — 1159F MED LIST DOCD IN RCRD: CPT | Performed by: FAMILY MEDICINE

## 2024-08-28 PROCEDURE — 3048F LDL-C <100 MG/DL: CPT | Performed by: FAMILY MEDICINE

## 2024-08-28 PROCEDURE — 3044F HG A1C LEVEL LT 7.0%: CPT | Performed by: FAMILY MEDICINE

## 2024-08-28 PROCEDURE — 1126F AMNT PAIN NOTED NONE PRSNT: CPT | Performed by: FAMILY MEDICINE

## 2024-08-28 PROCEDURE — 3060F POS MICROALBUMINURIA REV: CPT | Performed by: FAMILY MEDICINE

## 2024-08-28 PROCEDURE — 99213 OFFICE O/P EST LOW 20 MIN: CPT | Performed by: FAMILY MEDICINE

## 2024-08-28 PROCEDURE — 4010F ACE/ARB THERAPY RXD/TAKEN: CPT | Performed by: FAMILY MEDICINE

## 2024-08-28 PROCEDURE — 1170F FXNL STATUS ASSESSED: CPT | Performed by: FAMILY MEDICINE

## 2024-08-28 PROCEDURE — 1124F ACP DISCUSS-NO DSCNMKR DOCD: CPT | Performed by: FAMILY MEDICINE

## 2024-08-28 PROCEDURE — 1160F RVW MEDS BY RX/DR IN RCRD: CPT | Performed by: FAMILY MEDICINE

## 2024-08-28 PROCEDURE — 3078F DIAST BP <80 MM HG: CPT | Performed by: FAMILY MEDICINE

## 2024-08-28 ASSESSMENT — PATIENT HEALTH QUESTIONNAIRE - PHQ9
1. LITTLE INTEREST OR PLEASURE IN DOING THINGS: NOT AT ALL
SUM OF ALL RESPONSES TO PHQ9 QUESTIONS 1 AND 2: 0
2. FEELING DOWN, DEPRESSED OR HOPELESS: NOT AT ALL

## 2024-08-28 ASSESSMENT — PAIN SCALES - GENERAL: PAINLEVEL: 0-NO PAIN

## 2024-08-28 ASSESSMENT — LIFESTYLE VARIABLES
HOW OFTEN DO YOU HAVE SIX OR MORE DRINKS ON ONE OCCASION: NEVER
SKIP TO QUESTIONS 9-10: 1
AUDIT-C TOTAL SCORE: 0
HOW OFTEN DO YOU HAVE A DRINK CONTAINING ALCOHOL: NEVER
HOW MANY STANDARD DRINKS CONTAINING ALCOHOL DO YOU HAVE ON A TYPICAL DAY: PATIENT DOES NOT DRINK

## 2024-08-28 ASSESSMENT — ENCOUNTER SYMPTOMS
LOSS OF SENSATION IN FEET: 0
DEPRESSION: 0
OCCASIONAL FEELINGS OF UNSTEADINESS: 1

## 2024-08-28 ASSESSMENT — ACTIVITIES OF DAILY LIVING (ADL)
DOING_HOUSEWORK: INDEPENDENT
MANAGING_FINANCES: INDEPENDENT
TAKING_MEDICATION: INDEPENDENT
DRESSING: INDEPENDENT
GROCERY_SHOPPING: INDEPENDENT
BATHING: INDEPENDENT

## 2024-08-28 NOTE — PROGRESS NOTES
Subjective   Reason for Visit: Aisha Alcantar is an 73 y.o. female here for a Medicare Wellness visit.     Past Medical, Surgical, and Family History reviewed and updated in chart.    Reviewed all medications by prescribing practitioner or clinical pharmacist (such as prescriptions, OTCs, herbal therapies and supplements) and documented in the medical record.    Here for medicare physical.      For DM2:  A1c: 6.5  Follow up: On metformin. No new issues - stable.    Hypoglycemic Episodes: none  Glucose monitoring:  does not check    Hypertension  -Patient is here for follow-up of elevated blood pressure.  Pt had hyponatremia and hypomagnesia from on hydrochlorothiazide.  Resolved after stopping medication.   -Blood pressure is well controlled at home.   -Cardiac symptoms: none.   -Patient denies chest pain, dyspnea, and irregular heart beat.   -Cardiologist:  -Nephrology: Dr. Mcdonald - goes yearly    COPD:  -F/U: doing well.  Doing well with trelegy.  Not needing rescue inhaler.    -Medications Using: Albuterol  -Past Medications:  Trelegy  -Rescue Inhaler Use: Using 1-2 times a week  -Oxygen Use: none  -Nocturnal Symptoms:  none  -Specialist: none          Patient Care Team:  Edgar Sagastume DO as PCP - General (Family Medicine)  Edgar Sagastume DO as PCP - [employer] Medicare Advantage PCP  Sravan Mcdonald MD as Consulting Physician (Nephrology)  Yaneth Ward MD as Consulting Physician (Cardiology)     Review of Systems    Objective   Vitals:  /68 (BP Location: Left arm, Patient Position: Sitting)   Pulse 84   Wt 73 kg (161 lb)   SpO2 94%   BMI 31.44 kg/m²       Physical Exam  Vitals reviewed.   Constitutional:       General: She is not in acute distress.  Cardiovascular:      Rate and Rhythm: Normal rate and regular rhythm.      Heart sounds: Murmur heard.      Systolic murmur is present with a grade of 2/6.   Pulmonary:      Effort: Pulmonary effort is normal.      Breath sounds: No wheezing or  rhonchi.   Musculoskeletal:      Right lower leg: No edema.      Left lower leg: No edema.   Lymphadenopathy:      Cervical: No cervical adenopathy.   Neurological:      Mental Status: She is alert.         Assessment/Plan   Problem List Items Addressed This Visit             ICD-10-CM    Primary hypertension I10    Mixed hyperlipidemia E78.2    Chronic obstructive pulmonary disease (Multi) J44.9    Type 2 diabetes mellitus with neurologic complication (Multi) E11.49     Other Visit Diagnoses         Codes    Routine general medical examination at health care facility    -  Primary Z00.00              Low Dose CT Screening  We discussed the pros and cons of low dose CT screening for lung cancer based on the patient's smoking history.  This screening is recommended for patients who:  Are between the age of 50 to 77  Have a 20 pack-year smoking history (20 years of smoking a pack a day)  Are either a current smoker or have quit smoking within the last 15 years  Are in good health - no new cough or unexplained weight loss  Are willing to do the follow-up testing and treatment, if needed  Have not had a chest CT (CAT) scan in the last year     Patient Instructions   Here for medicare physical.  Up to date on blood work.  Recommend flu, RSV and covid vaccine - you can do at pharmacy.      For DM2 - A1c is 6.5 - well controlled.  Continue metformin.      For blood pressure - well controlled. Sodium levels back to normal off of hydrochlorothiazide    For COPD - stable on trelegy.  We referred to clinical pharmacists to see if able to get trelegy more affordable.     Follow up in 6 months for DM2 - blood work 1 week prior    Follow up in 6 months.

## 2024-08-28 NOTE — PATIENT INSTRUCTIONS
Here for medicare physical.  Up to date on blood work.  Recommend flu, RSV and covid vaccine - you can do at pharmacy.      For DM2 - A1c is 6.5 - well controlled.  Continue metformin.      For blood pressure - well controlled. Sodium levels back to normal off of hydrochlorothiazide    For COPD - stable on trelegy.  We referred to clinical pharmacists to see if able to get trelegy more affordable.     Follow up in 6 months for DM2 - blood work 1 week prior    Follow up in 6 months.   
08-Feb-2018

## 2024-09-06 ENCOUNTER — TELEPHONE (OUTPATIENT)
Dept: PRIMARY CARE | Facility: CLINIC | Age: 74
End: 2024-09-06
Payer: MEDICARE

## 2024-09-23 ENCOUNTER — APPOINTMENT (OUTPATIENT)
Dept: PRIMARY CARE | Facility: CLINIC | Age: 74
End: 2024-09-23
Payer: MEDICARE

## 2024-09-27 ENCOUNTER — APPOINTMENT (OUTPATIENT)
Dept: PHARMACY | Facility: HOSPITAL | Age: 74
End: 2024-09-27
Payer: MEDICARE

## 2024-09-27 DIAGNOSIS — J44.9 CHRONIC OBSTRUCTIVE PULMONARY DISEASE, UNSPECIFIED COPD TYPE (MULTI): ICD-10-CM

## 2024-09-27 NOTE — PROGRESS NOTES
Clinical Pharmacy Appointment    Patient ID: Aisha Alcantar is a 73 y.o. female who presents for COPD.    Pt is here for First appointment.     Referring Provider/PCP: Edgar Sagastume DO  Last visit with PCP: 8/28/2024   Next visit with PCP: 3/5/25      Subjective   HPI  COPD  Patient has been diagnosed with:  COPD  Does patient see pulmonology: No  has not had PFT's completed in last 2 years (2020)    Current Regimen  Trelegy 100 1 puff daily  Albuterol 2 puffs every 4-6 hours    Clarifications to above regimen: None   Adverse Effects: None  Appropriate technique? Yes    Historical Treatment  None    Symptom Management  Current symptoms:  Not discussed  Triggers: Not discussed  Alleviating factors: Not discussed    Exacerbation Hx  When was your last hospitalization for an exacerbation? Never  When was the last time you were treated with antibiotics and/or steroids? Never     Rescue Inhaler Use  How often do you use your rescue inhaler? A few times per week    Immunization History:  Influenza: Date [9/26/2023]  PCV13: Date [9/30/2019]  PPSV23: Date [1/13/2014]  PCV20: Date [None]  COVID: Date [9/26/2023]  RSV: Date [10/18/2023]    Smoking History  She currently smokes  unknown  packs per day.    Medication Reconciliation:  Changed:   Added:   Discontinued:     Drug Interactions  No relevant drug interactions were noted.    Medication System Management  Patients preferred pharmacy: [facility]  Adherence/Organization: No issues reported  Affordability/Accessibility: Trelegy expensive      Objective   Allergies   Allergen Reactions    Acetaminophen Unknown     Can take regular tylenol but not extra strengh    Cortisone Unknown     worsens whatever symptom she has    Lidocaine Unknown     She states that anesthetic increases her sensitivity to pain    Naproxen Sodium Unknown    Procaine Unknown     Social History     Social History Narrative    Not on file      Medication Review  Current Outpatient  Medications   Medication Instructions    albuterol 90 mcg/actuation inhaler USE 2 INHALATIONS EVERY 4 TO 6 HOURS AS NEEDED    amLODIPine (NORVASC) 10 mg, oral, Daily    aspirin 81 mg, oral, Daily    atorvastatin (LIPITOR) 40 mg, oral, Nightly    cholecalciferol (VITAMIN D-3) 400 Units, oral, Daily    fluticasone (Flonase) 50 mcg/actuation nasal spray 2 sprays, Each Nostril, Daily    fluticasone-umeclidin-vilanter (Trelegy Ellipta) 100-62.5-25 mcg blister with device 1 puff, inhalation, Daily    furosemide (LASIX) 40 mg, oral, Daily    lansoprazole (PREVACID) 30 mg, oral, Daily before breakfast    lidocaine (LMX 4) 4 % cream Topical, 3 times daily PRN    losartan (COZAAR) 100 mg, oral, Daily    magnesium oxide 500 mg, oral, Daily    metFORMIN  mg 24 hr tablet Take 2 tablets in the AM, and 1 tablet PM    sucralfate (CARAFATE) 1 g, oral, 4 times daily before meals and nightly    walker (Ultra-Light Rollator) misc Rollator - use daily<BR>Dx: OA, lumbar spondylosis      Vitals  BP Readings from Last 2 Encounters:   08/28/24 124/68   08/15/24 (!) 185/98     BMI Readings from Last 1 Encounters:   08/28/24 31.44 kg/m²      Labs  A1C  Lab Results   Component Value Date    HGBA1C 6.5 (H) 08/08/2024    HGBA1C 6.5 (H) 01/22/2024    HGBA1C 6.9 (A) 10/18/2023     BMP  Lab Results   Component Value Date    CALCIUM 9.3 08/08/2024     08/08/2024    K 4.7 08/08/2024    CO2 31 08/08/2024     08/08/2024    BUN 12 08/08/2024    CREATININE 0.73 08/08/2024    EGFR 86 08/08/2024     LFTs  Lab Results   Component Value Date    ALT 13 01/22/2024    AST 16 01/22/2024    ALKPHOS 52 01/22/2024    BILITOT 0.6 01/22/2024     FLP  Lab Results   Component Value Date    TRIG 92 01/22/2024    CHOL 128 08/08/2024    LDLF 60 05/04/2023    LDLCALC 36 01/22/2024    HDL 66.1 08/08/2024     Urine Microalbumin  Lab Results   Component Value Date    MICROALBCREA 19.3 02/01/2023     Weight Management  Wt Readings from Last 3 Encounters:    08/28/24 73 kg (161 lb)   08/15/24 73.3 kg (161 lb 9.6 oz)   04/19/24 73.5 kg (162 lb)      There is no height or weight on file to calculate BMI.     Assessment/Plan   Problem List Items Addressed This Visit       Chronic obstructive pulmonary disease (Multi)     Patient with COPD that is well controlled and stable. Based on CAT score, exacerbation history, and eosinophil count, patient is GOLD Group E and LABA+LAMA+ICS with as needed RONALD therapy is recommended.     Medication Changes:  CONTINUE  Trelegy 100 1 puff daily  Albuterol 2 puffs every 4-6 hours         Relevant Orders    Follow Up In Clinical Pharmacy      Patient Assistance Screening ([employer])  Patient verbally reports monthly or yearly income which is less than 400% federal poverty level  Application for program has been submitted for the following medications:   Trelegy  Albuterol  Patient aware this process may take up to 2 weeks once income documents have been sent to the team.  If approved, medication must be filled through [facility] pharmacy and may be picked up or mailed to patient.   If approved, medication will be billed through insurance, and patient assistance team will pay the copay. This will result in a $0 copay for the patient.  Counseled patient on mechanism of action, side effects, contraindications, and what to do if the patient misses a dose. All patients questions were answered.    Clinical Pharmacist follow-up: 10/10, Telehealth visit    Continue all meds under the continuation of care with the referring provider and clinical pharmacy team.    Thank you,  Veronica Hare, PharmD, [facility]  Clinical Pharmacy Specialist  [phone]    Verbal consent to manage patient's drug therapy was obtained from the patient. They were informed they may decline to participate or withdraw from participation in pharmacy services at any time.

## 2024-09-27 NOTE — ASSESSMENT & PLAN NOTE
Patient with COPD that is well controlled and stable. Based on CAT score, exacerbation history, and eosinophil count, patient is GOLD Group E and LABA+LAMA+ICS with as needed RONALD therapy is recommended.     Medication Changes:  CONTINUE  Trelegy 100 1 puff daily  Albuterol 2 puffs every 4-6 hours

## 2024-10-01 PROCEDURE — RXMED WILLOW AMBULATORY MEDICATION CHARGE

## 2024-10-03 ENCOUNTER — PHARMACY VISIT (OUTPATIENT)
Dept: PHARMACY | Facility: CLINIC | Age: 74
End: 2024-10-03
Payer: COMMERCIAL

## 2024-10-10 ENCOUNTER — APPOINTMENT (OUTPATIENT)
Dept: PHARMACY | Facility: HOSPITAL | Age: 74
End: 2024-10-10
Payer: MEDICARE

## 2024-10-10 DIAGNOSIS — J44.9 CHRONIC OBSTRUCTIVE PULMONARY DISEASE, UNSPECIFIED COPD TYPE (MULTI): ICD-10-CM

## 2024-10-10 RX ORDER — FLUTICASONE FUROATE, UMECLIDINIUM BROMIDE AND VILANTEROL TRIFENATATE 100; 62.5; 25 UG/1; UG/1; UG/1
1 POWDER RESPIRATORY (INHALATION) DAILY
Qty: 180 EACH | Refills: 3 | Status: SHIPPED | OUTPATIENT
Start: 2024-10-10 | End: 2025-10-05

## 2024-10-10 RX ORDER — ALBUTEROL SULFATE 90 UG/1
2 INHALANT RESPIRATORY (INHALATION) EVERY 4 HOURS PRN
Qty: 25.5 G | Refills: 3 | Status: SHIPPED | OUTPATIENT
Start: 2024-10-10

## 2024-10-10 NOTE — PROGRESS NOTES
Clinical Pharmacy Appointment    Patient ID: Aisha Alcantar is a 73 y.o. female who presents for COPD.    Pt is here for Follow Up appointment.     Referring Provider/PCP: Edgar Sagastume DO  Last visit with PCP: 8/28/2024   Next visit with PCP: 3/5/25      Subjective   HPI  COPD  Patient has been diagnosed with:  COPD  Does patient see pulmonology: No  has not had PFT's completed in last 2 years (2020)    Current Regimen  Trelegy 100 1 puff daily  Albuterol 2 puffs every 4-6 hours    Clarifications to above regimen: None   Adverse Effects: None  Appropriate technique? Yes    Historical Treatment  None    Symptom Management  Current symptoms:  Not discussed  Triggers: Not discussed  Alleviating factors: Not discussed    Exacerbation Hx  When was your last hospitalization for an exacerbation? Never  When was the last time you were treated with antibiotics and/or steroids? Never     Rescue Inhaler Use  How often do you use your rescue inhaler? A few times per week    Immunization History:  Influenza: Date [9/26/2023]  PCV13: Date [9/30/2019]  PPSV23: Date [1/13/2014]  PCV20: Date [None]  COVID: Date [9/26/2023]  RSV: Date [10/18/2023]    Smoking History  She currently smokes  unknown  packs per day.    Medication Reconciliation:  Changed:   Added:   Discontinued:     Drug Interactions  No relevant drug interactions were noted.    Medication System Management  Patients preferred pharmacy: [facility]  Adherence/Organization: No issues reported  Affordability/Accessibility: Trelegy and albuterol through [facility] through 10/1/2025      Objective   Allergies   Allergen Reactions    Acetaminophen Unknown     Can take regular tylenol but not extra strengh    Cortisone Unknown     worsens whatever symptom she has    Lidocaine Unknown     She states that anesthetic increases her sensitivity to pain    Naproxen Sodium Unknown    Procaine Unknown     Social History     Social History Narrative    Not on file       Medication Review  Current Outpatient Medications   Medication Instructions    albuterol 90 mcg/actuation inhaler 2 puffs, inhalation, Every 4 hours PRN    amLODIPine (NORVASC) 10 mg, oral, Daily    aspirin 81 mg, oral, Daily    atorvastatin (LIPITOR) 40 mg, oral, Nightly    cholecalciferol (VITAMIN D-3) 400 Units, oral, Daily    fluticasone (Flonase) 50 mcg/actuation nasal spray 2 sprays, Each Nostril, Daily    fluticasone-umeclidin-vilanter (Trelegy Ellipta) 100-62.5-25 mcg blister with device 1 puff, inhalation, Daily    furosemide (LASIX) 40 mg, oral, Daily    lansoprazole (PREVACID) 30 mg, oral, Daily before breakfast    lidocaine (LMX 4) 4 % cream Topical, 3 times daily PRN    losartan (COZAAR) 100 mg, oral, Daily    magnesium oxide 500 mg, oral, Daily    metFORMIN  mg 24 hr tablet Take 2 tablets in the AM, and 1 tablet PM    sucralfate (CARAFATE) 1 g, oral, 4 times daily before meals and nightly    walker (Ultra-Light Rollator) misc Rollator - use daily<BR>Dx: OA, lumbar spondylosis      Vitals  BP Readings from Last 2 Encounters:   08/28/24 124/68   08/15/24 (!) 185/98     BMI Readings from Last 1 Encounters:   08/28/24 31.44 kg/m²      Labs  A1C  Lab Results   Component Value Date    HGBA1C 6.5 (H) 08/08/2024    HGBA1C 6.5 (H) 01/22/2024    HGBA1C 6.9 (A) 10/18/2023     BMP  Lab Results   Component Value Date    CALCIUM 9.3 08/08/2024     08/08/2024    K 4.7 08/08/2024    CO2 31 08/08/2024     08/08/2024    BUN 12 08/08/2024    CREATININE 0.73 08/08/2024    EGFR 86 08/08/2024     LFTs  Lab Results   Component Value Date    ALT 13 01/22/2024    AST 16 01/22/2024    ALKPHOS 52 01/22/2024    BILITOT 0.6 01/22/2024     FLP  Lab Results   Component Value Date    TRIG 92 01/22/2024    CHOL 128 08/08/2024    LDLF 60 05/04/2023    LDLCALC 36 01/22/2024    HDL 66.1 08/08/2024     Urine Microalbumin  Lab Results   Component Value Date    MICROALBCREA 19.3 02/01/2023     Weight Management  Wt  Readings from Last 3 Encounters:   08/28/24 73 kg (161 lb)   08/15/24 73.3 kg (161 lb 9.6 oz)   04/19/24 73.5 kg (162 lb)      There is no height or weight on file to calculate BMI.     Assessment/Plan   Problem List Items Addressed This Visit       Chronic obstructive pulmonary disease (Multi)     Patient with COPD that is well controlled and stable. Based on CAT score, exacerbation history, and eosinophil count, patient is GOLD Group E and LABA+LAMA+ICS with as needed RONALD therapy is recommended.     Medication Changes:  CONTINUE  Trelegy 100 1 puff daily  Albuterol 2 puffs every 4-6 hours         Relevant Medications    fluticasone-umeclidin-vilanter (Trelegy Ellipta) 100-62.5-25 mcg blister with device    albuterol 90 mcg/actuation inhaler    Other Relevant Orders    Referral to Clinical Pharmacy     Clinical Pharmacist follow-up: 1/15/25, Telehealth visit    Continue all meds under the continuation of care with the referring provider and clinical pharmacy team.    Thank you,  Veronica Hare, PharmD, [facility]  Clinical Pharmacy Specialist  [phone]    Verbal consent to manage patient's drug therapy was obtained from the patient. They were informed they may decline to participate or withdraw from participation in pharmacy services at any time.

## 2024-10-10 NOTE — ASSESSMENT & PLAN NOTE
No Patient with COPD that is well controlled and stable. Based on CAT score, exacerbation history, and eosinophil count, patient is GOLD Group E and LABA+LAMA+ICS with as needed RONALD therapy is recommended.     Medication Changes:  CONTINUE  Trelegy 100 1 puff daily  Albuterol 2 puffs every 4-6 hours

## 2024-10-14 DIAGNOSIS — K21.9 GASTROESOPHAGEAL REFLUX DISEASE, UNSPECIFIED WHETHER ESOPHAGITIS PRESENT: ICD-10-CM

## 2024-10-14 DIAGNOSIS — Z87.11 HISTORY OF GASTRIC ULCER: ICD-10-CM

## 2024-10-14 RX ORDER — SUCRALFATE 1 G/1
1 TABLET ORAL
Qty: 360 TABLET | Refills: 3 | Status: SHIPPED | OUTPATIENT
Start: 2024-10-14

## 2024-10-14 RX ORDER — LANSOPRAZOLE 30 MG/1
30 CAPSULE, DELAYED RELEASE ORAL
Qty: 90 CAPSULE | Refills: 3 | Status: SHIPPED | OUTPATIENT
Start: 2024-10-14

## 2024-10-14 NOTE — TELEPHONE ENCOUNTER
Refill:  sucralfate (Carafate) 1 gram tablet Take 1 tablet (1 g) by mouth 4 times a day before meals.     lansoprazole (Prevacid) 30 mg DR capsule Take 1 capsule (30 mg) by mouth once daily in the morning. Take before meals.     Pharm:  [facility]

## 2024-10-18 ENCOUNTER — TELEMEDICINE (OUTPATIENT)
Dept: PRIMARY CARE | Facility: CLINIC | Age: 74
End: 2024-10-18
Payer: MEDICARE

## 2024-10-18 ENCOUNTER — TELEPHONE (OUTPATIENT)
Dept: PRIMARY CARE | Facility: CLINIC | Age: 74
End: 2024-10-18
Payer: MEDICARE

## 2024-10-18 DIAGNOSIS — J20.9 ACUTE BRONCHITIS, UNSPECIFIED ORGANISM: Primary | ICD-10-CM

## 2024-10-18 PROCEDURE — 4010F ACE/ARB THERAPY RXD/TAKEN: CPT | Performed by: FAMILY MEDICINE

## 2024-10-18 PROCEDURE — 3060F POS MICROALBUMINURIA REV: CPT | Performed by: FAMILY MEDICINE

## 2024-10-18 PROCEDURE — 3048F LDL-C <100 MG/DL: CPT | Performed by: FAMILY MEDICINE

## 2024-10-18 PROCEDURE — 1126F AMNT PAIN NOTED NONE PRSNT: CPT | Performed by: FAMILY MEDICINE

## 2024-10-18 PROCEDURE — 4004F PT TOBACCO SCREEN RCVD TLK: CPT | Performed by: FAMILY MEDICINE

## 2024-10-18 PROCEDURE — 99213 OFFICE O/P EST LOW 20 MIN: CPT | Performed by: FAMILY MEDICINE

## 2024-10-18 PROCEDURE — 3044F HG A1C LEVEL LT 7.0%: CPT | Performed by: FAMILY MEDICINE

## 2024-10-18 PROCEDURE — G2211 COMPLEX E/M VISIT ADD ON: HCPCS | Performed by: FAMILY MEDICINE

## 2024-10-18 RX ORDER — DOXYCYCLINE HYCLATE 100 MG
100 TABLET ORAL 2 TIMES DAILY
Qty: 20 TABLET | Refills: 0 | Status: SHIPPED | OUTPATIENT
Start: 2024-10-18 | End: 2024-10-28

## 2024-10-18 RX ORDER — PREDNISONE 10 MG/1
TABLET ORAL
Qty: 21 TABLET | Refills: 0 | Status: SHIPPED | OUTPATIENT
Start: 2024-10-18 | End: 2024-10-26

## 2024-10-18 ASSESSMENT — PAIN SCALES - GENERAL: PAINLEVEL_OUTOF10: 0-NO PAIN

## 2024-10-18 NOTE — PROGRESS NOTES
Subjective   Patient ID: Aisha Alcantar is a 73 y.o. female who presents for Sick Visit.    URI/Sinusitis/Bronchitis:  -Sx's: Pt has been getting shortness of breath.  No fevers.  Productive cough.  Congested.    -Duration: started 1 week.    -Treatment: trelegy, albuterol.  tylenol           Review of Systems    Objective   There were no vitals taken for this visit.    Physical Exam    Assessment/Plan   Diagnoses and all orders for this visit:  Acute bronchitis, unspecified organism  -     doxycycline (Vibra-Tabs) 100 mg tablet; Take 1 tablet (100 mg) by mouth 2 times a day for 10 days. Take with a full glass of water and do not lie down for at least 30 minutes after.  -     predniSONE (Deltasone) 10 mg tablet; Take 4 tablets (40 mg) by mouth once daily for 3 days, THEN 2 tablets (20 mg) once daily for 3 days, THEN 1 tablet (10 mg) once daily for 3 days.    Bronchitis -= doxycycline, albuterol and prednisone.  ER if worsening

## 2024-10-18 NOTE — TELEPHONE ENCOUNTER
Patient &  both have cold symptoms of cough, body aches, fatigue, unable to walk any distance without getting out of breath and headache. They have not taken COVID test.  They do not have a temp.  They do not feel well enough to come in to see Dr. Sagastume..  Willing to do telephone call.  Neither are comfortable with video.  Please call & advise.  Thank you

## 2024-12-03 ENCOUNTER — TELEPHONE (OUTPATIENT)
Dept: PRIMARY CARE | Facility: CLINIC | Age: 74
End: 2024-12-03
Payer: MEDICARE

## 2024-12-03 DIAGNOSIS — Z12.11 SCREENING FOR COLON CANCER: Primary | ICD-10-CM

## 2024-12-03 NOTE — TELEPHONE ENCOUNTER
Pt is calling to say she received a letter stating it's time for her Cologard test , she states to go ahead and order it.

## 2024-12-05 ENCOUNTER — APPOINTMENT (OUTPATIENT)
Dept: RADIOLOGY | Facility: HOSPITAL | Age: 74
End: 2024-12-05
Payer: MEDICARE

## 2024-12-12 ENCOUNTER — HOSPITAL ENCOUNTER (OUTPATIENT)
Dept: RADIOLOGY | Facility: HOSPITAL | Age: 74
Discharge: HOME | End: 2024-12-12
Payer: MEDICARE

## 2024-12-12 DIAGNOSIS — Z87.891 HISTORY OF TOBACCO USE: ICD-10-CM

## 2024-12-12 DIAGNOSIS — J44.9 CHRONIC OBSTRUCTIVE PULMONARY DISEASE, UNSPECIFIED COPD TYPE (MULTI): ICD-10-CM

## 2024-12-12 PROCEDURE — 71271 CT THORAX LUNG CANCER SCR C-: CPT

## 2024-12-17 DIAGNOSIS — R91.1 LEFT UPPER LOBE PULMONARY NODULE: Primary | ICD-10-CM

## 2024-12-22 DIAGNOSIS — I10 PRIMARY HYPERTENSION: ICD-10-CM

## 2024-12-22 RX ORDER — LOSARTAN POTASSIUM 100 MG/1
100 TABLET ORAL DAILY
Qty: 90 TABLET | Refills: 3 | Status: SHIPPED | OUTPATIENT
Start: 2024-12-22

## 2024-12-27 LAB — NONINV COLON CA DNA+OCC BLD SCRN STL QL: POSITIVE

## 2024-12-28 DIAGNOSIS — R19.5 POSITIVE COLORECTAL CANCER SCREENING USING COLOGUARD TEST: Primary | ICD-10-CM

## 2025-01-15 ENCOUNTER — APPOINTMENT (OUTPATIENT)
Dept: PHARMACY | Facility: HOSPITAL | Age: 75
End: 2025-01-15
Payer: MEDICARE

## 2025-01-15 DIAGNOSIS — J44.9 CHRONIC OBSTRUCTIVE PULMONARY DISEASE, UNSPECIFIED COPD TYPE (MULTI): ICD-10-CM

## 2025-01-15 PROCEDURE — RXMED WILLOW AMBULATORY MEDICATION CHARGE

## 2025-01-15 RX ORDER — FLUTICASONE FUROATE, UMECLIDINIUM BROMIDE AND VILANTEROL TRIFENATATE 100; 62.5; 25 UG/1; UG/1; UG/1
1 POWDER RESPIRATORY (INHALATION) DAILY
Qty: 180 EACH | Refills: 3 | Status: SHIPPED | OUTPATIENT
Start: 2025-01-15 | End: 2026-01-10

## 2025-01-15 RX ORDER — ALBUTEROL SULFATE 90 UG/1
2 INHALANT RESPIRATORY (INHALATION) EVERY 4 HOURS PRN
Qty: 54 G | Refills: 3 | Status: SHIPPED | OUTPATIENT
Start: 2025-01-15

## 2025-01-15 NOTE — PROGRESS NOTES
Clinical Pharmacy Appointment    Patient ID: Aisha Alcantar is a 74 y.o. female who presents for COPD.    Pt is here for Follow Up appointment.     Referring Provider/PCP: Edgar Sagastume DO  Last visit with PCP: 10/18/2024   Next visit with PCP: 3/5/25      Subjective   Interval History  [facility], so had to pay for inhalers instead of through V[employer]. Patient had respiratory infection in October and feels like she and her  have not recovered since then. Using albuterol much more frequently now.    HPI  COPD  Patient has been diagnosed with:  COPD  Does patient see pulmonology: No  has not had PFT's completed in last 2 years (2020)    Current Regimen  Trelegy 100 1 puff daily  Albuterol 2 puffs every 4-6 hours    Clarifications to above regimen: None   Adverse Effects: None  Appropriate technique? Yes    Historical Treatment  None    Symptom Management  Current symptoms:  Not discussed  Triggers: Not discussed  Alleviating factors: Not discussed    Exacerbation Hx  When was your last hospitalization for an exacerbation? Never  When was the last time you were treated with antibiotics and/or steroids? Never     Rescue Inhaler Use  How often do you use your rescue inhaler? Twice daily    Immunization History:  Influenza: Date [9/26/2023]  PCV13: Date [9/30/2019]  PPSV23: Date [1/13/2014]  PCV20: Date [None]  COVID: Date [10/8/2024]  RSV: Date [10/18/2023]    Smoking History  She currently smokes  unknown  packs per day.    Medication Reconciliation:  Changed:   Added:   Discontinued:     Drug Interactions  No relevant drug interactions were noted.    Medication System Management  Patients preferred pharmacy: [email or web address]  Adherence/Organization: No issues reported  Affordability/Accessibility: Trelegy and albuterol through [facility] through 10/1/2025      Objective   Allergies   Allergen Reactions    Acetaminophen Unknown     Can take regular tylenol but not extra strengh     Cortisone Unknown     worsens whatever symptom she has    Lidocaine Unknown     She states that anesthetic increases her sensitivity to pain    Naproxen Sodium Unknown    Procaine Unknown     Social History     Social History Narrative    Not on file      Medication Review  Current Outpatient Medications   Medication Instructions    albuterol 90 mcg/actuation inhaler 2 puffs, inhalation, Every 4 hours PRN    amLODIPine (NORVASC) 10 mg, oral, Daily    aspirin 81 mg, oral, Daily    atorvastatin (LIPITOR) 40 mg, oral, Nightly    cholecalciferol (VITAMIN D-3) 400 Units, oral, Daily    fluticasone (Flonase) 50 mcg/actuation nasal spray 2 sprays, Each Nostril, Daily    fluticasone-umeclidin-vilanter (Trelegy Ellipta) 100-62.5-25 mcg blister with device 1 puff, inhalation, Daily    furosemide (LASIX) 40 mg, oral, Daily    lansoprazole (PREVACID) 30 mg, oral, Daily before breakfast    lidocaine (LMX 4) 4 % cream Topical, 3 times daily PRN    losartan (COZAAR) 100 mg, oral, Daily    magnesium oxide 500 mg, oral, Daily    metFORMIN  mg 24 hr tablet Take 2 tablets in the AM, and 1 tablet PM    sucralfate (CARAFATE) 1 g, oral, 4 times daily before meals and nightly    walker (Ultra-Light Rollator) misc Rollator - use daily  Dx: OA, lumbar spondylosis      Vitals  BP Readings from Last 2 Encounters:   08/28/24 124/68   08/15/24 (!) 185/98     BMI Readings from Last 1 Encounters:   08/28/24 31.44 kg/m²      Labs  A1C  Lab Results   Component Value Date    HGBA1C 6.5 (H) 08/08/2024    HGBA1C 6.5 (H) 01/22/2024    HGBA1C 6.9 (A) 10/18/2023     BMP  Lab Results   Component Value Date    CALCIUM 9.3 08/08/2024     08/08/2024    K 4.7 08/08/2024    CO2 31 08/08/2024     08/08/2024    BUN 12 08/08/2024    CREATININE 0.73 08/08/2024    EGFR 86 08/08/2024     LFTs  Lab Results   Component Value Date    ALT 13 01/22/2024    AST 16 01/22/2024    ALKPHOS 52 01/22/2024    BILITOT 0.6 01/22/2024     FLP  Lab Results    Component Value Date    TRIG 92 01/22/2024    CHOL 128 08/08/2024    LDLF 60 05/04/2023    LDLCALC 36 01/22/2024    HDL 66.1 08/08/2024     Urine Microalbumin  Lab Results   Component Value Date    MICROALBCREA 19.3 02/01/2023     Weight Management  Wt Readings from Last 3 Encounters:   08/28/24 73 kg (161 lb)   08/15/24 73.3 kg (161 lb 9.6 oz)   04/19/24 73.5 kg (162 lb)      There is no height or weight on file to calculate BMI.     Assessment/Plan   Problem List Items Addressed This Visit       Chronic obstructive pulmonary disease (Multi)     Patient with COPD that is well controlled and stable. Based on CAT score, exacerbation history, and eosinophil count, patient is GOLD Group E and LABA+LAMA+ICS with as needed RONALD therapy is recommended.     Medication Changes:  CONTINUE  Trelegy 100 1 puff daily  Albuterol 2 puffs every 4-6 hours         Relevant Medications    fluticasone-umeclidin-vilanter (Trelegy Ellipta) 100-62.5-25 mcg blister with device    albuterol 90 mcg/actuation inhaler    Other Relevant Orders    Referral to Clinical Pharmacy     Clinical Pharmacist follow-up: Around September, Telehealth visit    Continue all meds under the continuation of care with the referring provider and clinical pharmacy team.    Thank you,  Veronica Hare, PharmD, [facility]  Clinical Pharmacy Specialist  [phone]    Verbal consent to manage patient's drug therapy was obtained from the patient. They were informed they may decline to participate or withdraw from participation in pharmacy services at any time.

## 2025-01-20 ENCOUNTER — PHARMACY VISIT (OUTPATIENT)
Dept: PHARMACY | Facility: CLINIC | Age: 75
End: 2025-01-20
Payer: COMMERCIAL

## 2025-02-24 ENCOUNTER — APPOINTMENT (OUTPATIENT)
Facility: CLINIC | Age: 75
End: 2025-02-24
Payer: MEDICARE

## 2025-02-24 VITALS — HEART RATE: 87 BPM | BODY MASS INDEX: 31.61 KG/M2 | HEIGHT: 60 IN | WEIGHT: 161 LBS

## 2025-02-24 DIAGNOSIS — R19.5 POSITIVE COLORECTAL CANCER SCREENING USING COLOGUARD TEST: ICD-10-CM

## 2025-02-24 PROCEDURE — 99205 OFFICE O/P NEW HI 60 MIN: CPT | Performed by: INTERNAL MEDICINE

## 2025-02-24 PROCEDURE — 1159F MED LIST DOCD IN RCRD: CPT | Performed by: INTERNAL MEDICINE

## 2025-02-24 NOTE — LETTER
February 24, 2025     Edgar Sagastume DO  [address]  [address]  [facility] [address]    Patient: Aisha Alcantar   YOB: 1950   Date of Visit: 2/24/2025       Dear Dr. Edgar Sagastume DO:    Thank you for referring Aisha Alcantar to me for evaluation. Below are my notes for this consultation.  If you have questions, please do not hesitate to call me. I look forward to following your patient along with you.       Sincerely,     Marley Martino MD, MS      CC: No Recipients  ______________________________________________________________________________________    Primary Care Provider: Edgar Sagastume DO  Referring Provider: Edgar Sagastume DO  My final recommendations will be communicated back to the referring physician and/or primary care provider via shared medical records or via US mail.    Chief Complaint (Reason for visit):   Aisha Alcantar is a 74 y.o. female who presents for positive cologuard test     ASSESSMENT AND PLAN     Assessment & Plan  Positive colorectal cancer screening using Cologuard test  Pt states that she had a colonoscopy several years ago (cannot find records)  Recently she had a cologuard test and it was positive  She has irregular BMs and hemorroids, no overt bleeding.    - Cscope in hospital. Pt has COPD and still smokes. She cannot walk a block without resting and feeling SOB.     Orders:  •  Referral to Gastroenterology  •  Colonoscopy Screening; Average Risk Patient; Future      I discussed the risks, benefits and alternative(s) of the procedure(s) with the patient. Pt verbalizes understanding and is willing to proceed. All questions were answered. We discussed that she is at high risk for procedure related complications due to her overall comorbidites    Marley Martino MD, MS    SUBJECTIVE   HPI: History obtained from patient    10/2025 got influenza  Was prescribed Abx and antiinflammatory  She had some stomach pain    She had a cologuard test,which came back to be  positive  She attributed the positive cologuard to medicines    Pt states that she had a colonoscopy several years ago (cannot find records)  Recently she had a cologuard test and it was positive  She has irregular BMs and hemorroids, no overt bleeding.    Past Medical History:   Diagnosis Date   • Abdominal discomfort, generalized 05/04/2023   • Abdominal pain, acute, left lower quadrant 05/04/2023   • Acute candidiasis of vulva and vagina 10/06/2014    Yeast vaginitis   • Claudication (CMS-HCC) 05/04/2023   • Coronary artery calcification seen on CAT scan 05/04/2023   • Fibromyalgia 11/21/2022    Fibromyalgia   • Left hip pain 05/04/2023   • Other specified diabetes mellitus without complications     Diabetes mellitus of other type without complication   • Personal history of other diseases of the circulatory system     History of hypertension   • Personal history of other diseases of the digestive system 10/05/2020    History of gastritis   • Personal history of other diseases of the respiratory system     History of chronic obstructive lung disease   • Personal history of other specified conditions     History of caries   • Personal history of urinary (tract) infections 11/08/2013    History of urinary tract infection   • S/P cataract surgery 07/12/2023   • Sebaceous cyst 05/04/2023   • Trapezius muscle spasm 05/04/2023   • Unspecified otitis externa, unspecified ear 06/26/2015    Otitis externa       Past Surgical History:   Procedure Laterality Date   • APPENDECTOMY  05/20/2013    Appendectomy   • CHOLECYSTECTOMY  05/20/2013    Cholecystectomy   • GALLBLADDER SURGERY  09/02/2015    Gallbladder Surgery   • HERNIA REPAIR  09/02/2015    Hernia Repair   • OTHER SURGICAL HISTORY  05/11/2020    Oral surgery       Current Outpatient Medications   Medication Sig Dispense Refill   • albuterol 90 mcg/actuation inhaler Inhale 2 puffs every 4 hours if needed for wheezing. 54 g 3   • amLODIPine (Norvasc) 10 mg tablet Take  1 tablet (10 mg) by mouth once daily. 90 tablet 3   • aspirin 81 mg chewable tablet Chew 1 tablet (81 mg) once daily.     • atorvastatin (Lipitor) 40 mg tablet Take 1 tablet (40 mg) by mouth once daily at bedtime. 90 tablet 3   • cholecalciferol (Vitamin D-3) 10 MCG (400 UNIT) tablet Take 1 tablet (10 mcg) by mouth once daily.     • fluticasone (Flonase) 50 mcg/actuation nasal spray Administer 2 sprays into each nostril once daily. 16 g 2   • fluticasone-umeclidin-vilanter (Trelegy Ellipta) 100-62.5-25 mcg blister with device Inhale 1 puff once daily. 180 each 3   • furosemide (Lasix) 40 mg tablet Take 1 tablet (40 mg) by mouth once daily. 90 tablet 3   • lansoprazole (Prevacid) 30 mg DR capsule Take 1 capsule (30 mg) by mouth once daily in the morning. Take before meals. 90 capsule 3   • lidocaine (LMX 4) 4 % cream Apply topically 3 times a day as needed for mild pain (1 - 3). 30 g 1   • losartan (Cozaar) 100 mg tablet TAKE 1 TABLET DAILY 90 tablet 3   • metFORMIN  mg 24 hr tablet Take 2 tablets in the AM, and 1 tablet  tablet 3   • sucralfate (Carafate) 1 gram tablet Take 1 tablet (1 g) by mouth 4 times a day before meals. 360 tablet 3   • walker (Ultra-Light Rollator) misc Rollator - use daily  Dx: OA, lumbar spondylosis 1 each 0     No current facility-administered medications for this visit.       Allergies   Allergen Reactions   • Acetaminophen Unknown     Can take regular tylenol but not extra strengh   • Cortisone Unknown     worsens whatever symptom she has   • Lidocaine Unknown     She states that anesthetic increases her sensitivity to pain   • Naproxen Sodium Unknown   • Procaine Unknown     OBJECTIVE   PHYSICAL EXAM:  Pulse 87   Ht 1.524 m (5')   Wt 73 kg (161 lb)   BMI 31.44 kg/m²      LABS/IMAGING/SCOPES  Lab Results   Component Value Date    WBC 10.4 01/22/2024    HGB 15.1 01/22/2024    HCT 46.7 (H) 01/22/2024    MCV 87 01/22/2024     01/22/2024     Lab Results   Component Value  Date    GLUCOSE 136 (H) 08/08/2024    CALCIUM 9.3 08/08/2024     08/08/2024    K 4.7 08/08/2024    CO2 31 08/08/2024     08/08/2024    BUN 12 08/08/2024    CREATININE 0.73 08/08/2024     Lab Results   Component Value Date    ALT 13 01/22/2024    AST 16 01/22/2024    ALKPHOS 52 01/22/2024    BILITOT 0.6 01/22/2024       === 12/12/24 ===    CT LUNG SCREENING LOW DOSE    - Impression -  1. There is interval appearance of a 6 mm left upper lobe nodular  density. While this may represent mucoid impaction, a developing  neoplasm can not be excluded and a three-month low-dose chest CT is  recommended for surveillance.  2. Estimated coronary artery calcium score is 664* which correlates  with at least 89th percentile rank as compared to matched SABA-study  subjects(https://www.saba-nhlbi.org/Calcium/input.aspx).    LUNG RADS CATEGORY:  Lung Rad: Lung-RADS 4A, NO PET CT (Suspicious)    Recommendation: Follow up Low Dose Chest CT in 3 months, recommended  as per American College of Radiology Guidelines Lung-RADS Version  2022. Recommend F/U with Pulmonologist.        *The patient's CAC score was measured with an FDA-cleared AI tool  that correlates well with traditional methods. However, due to the  non-gated CT scan and new algorithm, AI-powered scores should not  replace traditional cardiovascular risk assessment. For further  assistance, refer to the [facility] Cardiovascular Prevention  Program via an [facility] referral to 'Cardiology Prevention Program.'    MACRO:  Critical Finding:  See findings. Notification was initiated on  12/13/2024 at 7:31 am by  Mat Diaz.  (**-YCF-**) Instructions:    Signed by: Mat Diaz 12/13/2024 7:31 AM  Dictation workstation:   [id number]    Marley Martino MD, MS

## 2025-02-24 NOTE — PROGRESS NOTES
Primary Care Provider: Edgar Sagastume DO  Referring Provider: Edgar Sagastume DO  My final recommendations will be communicated back to the referring physician and/or primary care provider via shared medical records or via US mail.    Chief Complaint (Reason for visit):   Aisha Alcantar is a 74 y.o. female who presents for positive cologuard test     ASSESSMENT AND PLAN     Assessment & Plan  Positive colorectal cancer screening using Cologuard test  Pt states that she had a colonoscopy several years ago (cannot find records)  Recently she had a cologuard test and it was positive  She has irregular BMs and hemorroids, no overt bleeding.    - Cscope in hospital. Pt has COPD and still smokes. She cannot walk a block without resting and feeling SOB.     Orders:    Referral to Gastroenterology    Colonoscopy Screening; Average Risk Patient; Future      I discussed the risks, benefits and alternative(s) of the procedure(s) with the patient. Pt verbalizes understanding and is willing to proceed. All questions were answered. We discussed that she is at high risk for procedure related complications due to her overall comorbidites    Marley Martino MD, MS    SUBJECTIVE   HPI: History obtained from patient    10/2025 got influenza  Was prescribed Abx and antiinflammatory  She had some stomach pain    She had a cologuard test,which came back to be positive  She attributed the positive cologuard to medicines    Pt states that she had a colonoscopy several years ago (cannot find records)  Recently she had a cologuard test and it was positive  She has irregular BMs and hemorroids, no overt bleeding.    Past Medical History:   Diagnosis Date    Abdominal discomfort, generalized 05/04/2023    Abdominal pain, acute, left lower quadrant 05/04/2023    Acute candidiasis of vulva and vagina 10/06/2014    Yeast vaginitis    Claudication (CMS-[facility]) 05/04/2023    Coronary artery calcification seen on CAT scan 05/04/2023     Fibromyalgia 11/21/2022    Fibromyalgia    Left hip pain 05/04/2023    Other specified diabetes mellitus without complications     Diabetes mellitus of other type without complication    Personal history of other diseases of the circulatory system     History of hypertension    Personal history of other diseases of the digestive system 10/05/2020    History of gastritis    Personal history of other diseases of the respiratory system     History of chronic obstructive lung disease    Personal history of other specified conditions     History of caries    Personal history of urinary (tract) infections 11/08/2013    History of urinary tract infection    S/P cataract surgery 07/12/2023    Sebaceous cyst 05/04/2023    Trapezius muscle spasm 05/04/2023    Unspecified otitis externa, unspecified ear 06/26/2015    Otitis externa       Past Surgical History:   Procedure Laterality Date    APPENDECTOMY  05/20/2013    Appendectomy    CHOLECYSTECTOMY  05/20/2013    Cholecystectomy    GALLBLADDER SURGERY  09/02/2015    Gallbladder Surgery    HERNIA REPAIR  09/02/2015    Hernia Repair    OTHER SURGICAL HISTORY  05/11/2020    Oral surgery       Current Outpatient Medications   Medication Sig Dispense Refill    albuterol 90 mcg/actuation inhaler Inhale 2 puffs every 4 hours if needed for wheezing. 54 g 3    amLODIPine (Norvasc) 10 mg tablet Take 1 tablet (10 mg) by mouth once daily. 90 tablet 3    aspirin 81 mg chewable tablet Chew 1 tablet (81 mg) once daily.      atorvastatin (Lipitor) 40 mg tablet Take 1 tablet (40 mg) by mouth once daily at bedtime. 90 tablet 3    cholecalciferol (Vitamin D-3) 10 MCG (400 UNIT) tablet Take 1 tablet (10 mcg) by mouth once daily.      fluticasone (Flonase) 50 mcg/actuation nasal spray Administer 2 sprays into each nostril once daily. 16 g 2    fluticasone-umeclidin-vilanter (Trelegy Ellipta) 100-62.5-25 mcg blister with device Inhale 1 puff once daily. 180 each 3    furosemide (Lasix) 40 mg  tablet Take 1 tablet (40 mg) by mouth once daily. 90 tablet 3    lansoprazole (Prevacid) 30 mg DR capsule Take 1 capsule (30 mg) by mouth once daily in the morning. Take before meals. 90 capsule 3    lidocaine (LMX 4) 4 % cream Apply topically 3 times a day as needed for mild pain (1 - 3). 30 g 1    losartan (Cozaar) 100 mg tablet TAKE 1 TABLET DAILY 90 tablet 3    metFORMIN  mg 24 hr tablet Take 2 tablets in the AM, and 1 tablet  tablet 3    sucralfate (Carafate) 1 gram tablet Take 1 tablet (1 g) by mouth 4 times a day before meals. 360 tablet 3    walker (Ultra-Light Rollator) misc Rollator - use daily  Dx: OA, lumbar spondylosis 1 each 0     No current facility-administered medications for this visit.       Allergies   Allergen Reactions    Acetaminophen Unknown     Can take regular tylenol but not extra strengh    Cortisone Unknown     worsens whatever symptom she has    Lidocaine Unknown     She states that anesthetic increases her sensitivity to pain    Naproxen Sodium Unknown    Procaine Unknown     OBJECTIVE   PHYSICAL EXAM:  Pulse 87   Ht 1.524 m (5')   Wt 73 kg (161 lb)   BMI 31.44 kg/m²      LABS/IMAGING/SCOPES  Lab Results   Component Value Date    WBC 10.4 01/22/2024    HGB 15.1 01/22/2024    HCT 46.7 (H) 01/22/2024    MCV 87 01/22/2024     01/22/2024     Lab Results   Component Value Date    GLUCOSE 136 (H) 08/08/2024    CALCIUM 9.3 08/08/2024     08/08/2024    K 4.7 08/08/2024    CO2 31 08/08/2024     08/08/2024    BUN 12 08/08/2024    CREATININE 0.73 08/08/2024     Lab Results   Component Value Date    ALT 13 01/22/2024    AST 16 01/22/2024    ALKPHOS 52 01/22/2024    BILITOT 0.6 01/22/2024       === 12/12/24 ===    CT LUNG SCREENING LOW DOSE    - Impression -  1. There is interval appearance of a 6 mm left upper lobe nodular  density. While this may represent mucoid impaction, a developing  neoplasm can not be excluded and a three-month low-dose chest CT  is  recommended for surveillance.  2. Estimated coronary artery calcium score is 664* which correlates  with at least 89th percentile rank as compared to matched SABA-study  subjects(https://www.saba-nhlbi.org/Calcium/input.aspx).    LUNG RADS CATEGORY:  Lung Rad: Lung-RADS 4A, NO PET CT (Suspicious)    Recommendation: Follow up Low Dose Chest CT in 3 months, recommended  as per American College of Radiology Guidelines Lung-RADS Version  2022. Recommend F/U with Pulmonologist.        *The patient's CAC score was measured with an FDA-cleared AI tool  that correlates well with traditional methods. However, due to the  non-gated CT scan and new algorithm, AI-powered scores should not  replace traditional cardiovascular risk assessment. For further  assistance, refer to the [facility] Cardiovascular Prevention  Program via an [facility] referral to 'Cardiology Prevention Program.'    MACRO:  Critical Finding:  See findings. Notification was initiated on  12/13/2024 at 7:31 am by  Mat Diaz.  (**-YCF-**) Instructions:    Signed by: Mat Diaz 12/13/2024 7:31 AM  Dictation workstation:   [id number]    Marley Martino MD, MS

## 2025-02-27 LAB
ALBUMIN SERPL-MCNC: 4.5 G/DL (ref 3.6–5.1)
ALP SERPL-CCNC: 59 U/L (ref 37–153)
ALT SERPL-CCNC: 14 U/L (ref 6–29)
ANION GAP SERPL CALCULATED.4IONS-SCNC: 11 MMOL/L (CALC) (ref 7–17)
AST SERPL-CCNC: 15 U/L (ref 10–35)
BASOPHILS # BLD AUTO: 44 CELLS/UL (ref 0–200)
BASOPHILS NFR BLD AUTO: 0.4 %
BILIRUB SERPL-MCNC: 0.3 MG/DL (ref 0.2–1.2)
BUN SERPL-MCNC: 12 MG/DL (ref 7–25)
CALCIUM SERPL-MCNC: 9.2 MG/DL (ref 8.6–10.4)
CHLORIDE SERPL-SCNC: 99 MMOL/L (ref 98–110)
CHOLEST SERPL-MCNC: 118 MG/DL
CHOLEST/HDLC SERPL: 1.7 (CALC)
CO2 SERPL-SCNC: 30 MMOL/L (ref 20–32)
CREAT SERPL-MCNC: 0.78 MG/DL (ref 0.6–1)
EGFRCR SERPLBLD CKD-EPI 2021: 79 ML/MIN/1.73M2
EOSINOPHIL # BLD AUTO: 56 CELLS/UL (ref 15–500)
EOSINOPHIL NFR BLD AUTO: 0.5 %
ERYTHROCYTE [DISTWIDTH] IN BLOOD BY AUTOMATED COUNT: 13.4 % (ref 11–15)
EST. AVERAGE GLUCOSE BLD GHB EST-MCNC: 146 MG/DL
EST. AVERAGE GLUCOSE BLD GHB EST-SCNC: 8.1 MMOL/L
GLUCOSE SERPL-MCNC: 141 MG/DL (ref 65–139)
HBA1C MFR BLD: 6.7 % OF TOTAL HGB
HCT VFR BLD AUTO: 49.8 % (ref 35–45)
HDLC SERPL-MCNC: 68 MG/DL
HGB BLD-MCNC: 16.2 G/DL (ref 11.7–15.5)
LDLC SERPL CALC-MCNC: 26 MG/DL (CALC)
LYMPHOCYTES # BLD AUTO: 3996 CELLS/UL (ref 850–3900)
LYMPHOCYTES NFR BLD AUTO: 36 %
MCH RBC QN AUTO: 28.9 PG (ref 27–33)
MCHC RBC AUTO-ENTMCNC: 32.5 G/DL (ref 32–36)
MCV RBC AUTO: 88.9 FL (ref 80–100)
MONOCYTES # BLD AUTO: 821 CELLS/UL (ref 200–950)
MONOCYTES NFR BLD AUTO: 7.4 %
NEUTROPHILS # BLD AUTO: 6183 CELLS/UL (ref 1500–7800)
NEUTROPHILS NFR BLD AUTO: 55.7 %
NONHDLC SERPL-MCNC: 50 MG/DL (CALC)
PLATELET # BLD AUTO: 358 THOUSAND/UL (ref 140–400)
PMV BLD REES-ECKER: 10.7 FL (ref 7.5–12.5)
POTASSIUM SERPL-SCNC: 4.5 MMOL/L (ref 3.5–5.3)
PROT SERPL-MCNC: 6.8 G/DL (ref 6.1–8.1)
RBC # BLD AUTO: 5.6 MILLION/UL (ref 3.8–5.1)
SODIUM SERPL-SCNC: 140 MMOL/L (ref 135–146)
TRIGL SERPL-MCNC: 166 MG/DL
WBC # BLD AUTO: 11.1 THOUSAND/UL (ref 3.8–10.8)

## 2025-03-05 ENCOUNTER — OFFICE VISIT (OUTPATIENT)
Dept: PRIMARY CARE | Facility: CLINIC | Age: 75
End: 2025-03-05
Payer: MEDICARE

## 2025-03-05 VITALS
SYSTOLIC BLOOD PRESSURE: 136 MMHG | WEIGHT: 162.2 LBS | OXYGEN SATURATION: 94 % | DIASTOLIC BLOOD PRESSURE: 72 MMHG | HEART RATE: 92 BPM | BODY MASS INDEX: 31.68 KG/M2 | TEMPERATURE: 95.8 F

## 2025-03-05 DIAGNOSIS — J44.9 CHRONIC OBSTRUCTIVE PULMONARY DISEASE, UNSPECIFIED COPD TYPE (MULTI): ICD-10-CM

## 2025-03-05 DIAGNOSIS — R60.0 BILATERAL LEG EDEMA: ICD-10-CM

## 2025-03-05 DIAGNOSIS — I10 PRIMARY HYPERTENSION: ICD-10-CM

## 2025-03-05 DIAGNOSIS — E11.49 TYPE 2 DIABETES MELLITUS WITH OTHER NEUROLOGIC COMPLICATION, WITHOUT LONG-TERM CURRENT USE OF INSULIN: Primary | ICD-10-CM

## 2025-03-05 DIAGNOSIS — E78.2 MIXED HYPERLIPIDEMIA: ICD-10-CM

## 2025-03-05 PROCEDURE — 99214 OFFICE O/P EST MOD 30 MIN: CPT | Performed by: FAMILY MEDICINE

## 2025-03-05 PROCEDURE — 3075F SYST BP GE 130 - 139MM HG: CPT | Performed by: FAMILY MEDICINE

## 2025-03-05 PROCEDURE — 1160F RVW MEDS BY RX/DR IN RCRD: CPT | Performed by: FAMILY MEDICINE

## 2025-03-05 PROCEDURE — G2211 COMPLEX E/M VISIT ADD ON: HCPCS | Performed by: FAMILY MEDICINE

## 2025-03-05 PROCEDURE — 1125F AMNT PAIN NOTED PAIN PRSNT: CPT | Performed by: FAMILY MEDICINE

## 2025-03-05 PROCEDURE — 1159F MED LIST DOCD IN RCRD: CPT | Performed by: FAMILY MEDICINE

## 2025-03-05 PROCEDURE — 3078F DIAST BP <80 MM HG: CPT | Performed by: FAMILY MEDICINE

## 2025-03-05 RX ORDER — FUROSEMIDE 40 MG/1
40 TABLET ORAL DAILY
Qty: 90 TABLET | Refills: 3 | Status: SHIPPED | OUTPATIENT
Start: 2025-03-05 | End: 2026-03-05

## 2025-03-05 ASSESSMENT — LIFESTYLE VARIABLES
SKIP TO QUESTIONS 9-10: 1
AUDIT-C TOTAL SCORE: 0
HOW MANY STANDARD DRINKS CONTAINING ALCOHOL DO YOU HAVE ON A TYPICAL DAY: PATIENT DOES NOT DRINK
HOW OFTEN DO YOU HAVE SIX OR MORE DRINKS ON ONE OCCASION: NEVER
HOW OFTEN DO YOU HAVE A DRINK CONTAINING ALCOHOL: NEVER

## 2025-03-05 ASSESSMENT — PATIENT HEALTH QUESTIONNAIRE - PHQ9
1. LITTLE INTEREST OR PLEASURE IN DOING THINGS: NOT AT ALL
2. FEELING DOWN, DEPRESSED OR HOPELESS: NOT AT ALL
SUM OF ALL RESPONSES TO PHQ9 QUESTIONS 1 & 2: 0

## 2025-03-05 ASSESSMENT — ENCOUNTER SYMPTOMS
LOSS OF SENSATION IN FEET: 0
DEPRESSION: 0
OCCASIONAL FEELINGS OF UNSTEADINESS: 1

## 2025-03-05 ASSESSMENT — PAIN SCALES - GENERAL: PAINLEVEL_OUTOF10: 6

## 2025-03-05 NOTE — PROGRESS NOTES
Subjective   Patient ID: Aisha Alcantar is a 74 y.o. female who presents for Diabetes.    Here for medicare physical.      For DM2:  A1c: 6.7  Follow up: On metformin. No new issues - stable.  No new issues. Weight is stable.   Hypoglycemic Episodes: none  Glucose monitoring:  does not check    Hypertension  -Patient is here for follow-up of elevated blood pressure.  Pt is on amlodipine and losartan.  Off of hydrochlorothiazide due to low sodium  -Blood pressure is well controlled at home.   -Cardiac symptoms: none.   -Patient denies chest pain, dyspnea, and irregular heart beat.   -Cardiologist:  -Nephrology: Dr. Mcdonald - goes yearly    COPD:  -F/U: Doing well with trelegy.  Was ill in oct - did not recover fully.    -Medications Using: Albuterol, Trelegy  -Past Medications:    -Rescue Inhaler Use: Using albuterol 1-2 times a day  -Oxygen Use: none  -Nocturnal Symptoms:  none  -Specialist: none      Diabetes         Review of Systems    Objective   /72 (BP Location: Left arm, Patient Position: Sitting, BP Cuff Size: Adult)   Pulse 92   Temp 35.4 °C (95.8 °F) (Temporal)   Wt 73.6 kg (162 lb 3.2 oz)   SpO2 94%   BMI 31.68 kg/m²     Physical Exam  Vitals reviewed.   Constitutional:       General: She is not in acute distress.  Cardiovascular:      Rate and Rhythm: Normal rate and regular rhythm.      Heart sounds: Murmur heard.      Systolic murmur is present with a grade of 2/6.   Pulmonary:      Effort: Pulmonary effort is normal.      Breath sounds: No wheezing or rhonchi.   Musculoskeletal:      Right lower leg: No edema.      Left lower leg: No edema.      Comments: Using rollator   Lymphadenopathy:      Cervical: No cervical adenopathy.   Neurological:      Mental Status: She is alert.         Assessment/Plan   Diagnoses and all orders for this visit:  Type 2 diabetes mellitus with other neurologic complication, without long-term current use of insulin  Primary hypertension  Chronic obstructive  pulmonary disease, unspecified COPD type (Multi)  Mixed hyperlipidemia    Patient Instructions   For Dm2 - A1c is 6.7 - well controlled on metformin    For COPD - need to continue with smoking cessation.  Patient is on trelegy.  Continue albuterol as needed.     For blood pressure - stable on amlodipine and losartan.  Seeing Dr. Han.      For cholesterol - well controlled atorvastatin.      For immunizations - you can get the following at the pharmacy:  Tdap, ask pharmacy about MMR    Follow up in 6 months for diabetes visit.

## 2025-03-17 ENCOUNTER — HOSPITAL ENCOUNTER (OUTPATIENT)
Dept: RADIOLOGY | Facility: HOSPITAL | Age: 75
Discharge: HOME | End: 2025-03-17
Payer: MEDICARE

## 2025-03-17 DIAGNOSIS — R91.1 LEFT UPPER LOBE PULMONARY NODULE: ICD-10-CM

## 2025-03-17 PROCEDURE — 71250 CT THORAX DX C-: CPT

## 2025-03-26 PROCEDURE — RXMED WILLOW AMBULATORY MEDICATION CHARGE

## 2025-03-27 ENCOUNTER — PHARMACY VISIT (OUTPATIENT)
Dept: PHARMACY | Facility: CLINIC | Age: 75
End: 2025-03-27
Payer: COMMERCIAL

## 2025-04-03 ENCOUNTER — TELEPHONE (OUTPATIENT)
Dept: PRIMARY CARE | Facility: CLINIC | Age: 75
End: 2025-04-03
Payer: MEDICARE

## 2025-04-03 NOTE — TELEPHONE ENCOUNTER
MARYELLEN pt called to let us know that she is not feeling good and she was requesting medication, I informed her that because she has not been seen that we can't prescribe her meds unless she has been seen by the dr that she would need to go to  or  on demand because her PCP has no openings

## 2025-04-04 NOTE — TELEPHONE ENCOUNTER
Aisha returning our call. She was read 's message to go to  or on demand. Aisha is agreeable.    Aisha's # [phone]

## 2025-04-04 NOTE — TELEPHONE ENCOUNTER
Left message informing patient that Dr. Sagastume also suggested urgent care or [facility] on demand if patient did not go yesterday.

## 2025-04-11 ENCOUNTER — TELEPHONE (OUTPATIENT)
Dept: PRIMARY CARE | Facility: CLINIC | Age: 75
End: 2025-04-11
Payer: MEDICARE

## 2025-04-11 DIAGNOSIS — R10.13 EPIGASTRIC PAIN: Primary | ICD-10-CM

## 2025-04-11 NOTE — TELEPHONE ENCOUNTER
Aisha is calling stating she was seen with Gunner Khanna in [facility] 25. She did not like that office and does not want to go to [facility] any more. She is asking for a different referral to gastroenterology.  She was scheduled for a Colonoscopy on 25 and canceled her appointment. She would like a appointment to discuss with the Dr. PT is stating she is still having stomach pain since she was on her antibiotic: back in October. She wants a appointment to discuss.    doxycycline (Vibra-Tabs) 100 mg tablet [[id number]]      Order Details  Dose: 100 mg Route: oral Frequency: 2 times daily   Dispense Quantity: 20 tablet Refills: 0          Sig: Take 1 tablet (100 mg) by mouth 2 times a day for 10 days. Take with a full glass of water and do not lie down for at least 30 minutes after.         Start Date: 10/18/24 End Date: 10/28/24 after 20 doses   Written Date: 10/18/24 Rx Expiration Date: 10/18/25        Associated Diagnoses: Acute bronchitis, unspecified organism [J20.9]   Providers    Ordering and Authorizing Provider: Edgar Sagastume DO NPI: [id number]   GINGER #: [id number]   Ordering User: DO Aisha Santiago's # [phone]

## 2025-04-13 DIAGNOSIS — I10 PRIMARY HYPERTENSION: ICD-10-CM

## 2025-04-13 DIAGNOSIS — E78.5 HYPERLIPIDEMIA, UNSPECIFIED HYPERLIPIDEMIA TYPE: ICD-10-CM

## 2025-04-13 RX ORDER — AMLODIPINE BESYLATE 10 MG/1
10 TABLET ORAL DAILY
Qty: 90 TABLET | Refills: 3 | Status: SHIPPED | OUTPATIENT
Start: 2025-04-13

## 2025-04-13 RX ORDER — ATORVASTATIN CALCIUM 40 MG/1
40 TABLET, FILM COATED ORAL NIGHTLY
Qty: 90 TABLET | Refills: 3 | Status: SHIPPED | OUTPATIENT
Start: 2025-04-13

## 2025-04-23 ENCOUNTER — APPOINTMENT (OUTPATIENT)
Dept: GASTROENTEROLOGY | Facility: HOSPITAL | Age: 75
End: 2025-04-23
Payer: MEDICARE

## 2025-06-10 ENCOUNTER — TELEPHONE (OUTPATIENT)
Dept: PRIMARY CARE | Facility: CLINIC | Age: 75
End: 2025-06-10
Payer: MEDICARE

## 2025-06-10 DIAGNOSIS — J30.89 ALLERGIC RHINITIS DUE TO OTHER ALLERGIC TRIGGER, UNSPECIFIED SEASONALITY: ICD-10-CM

## 2025-06-10 RX ORDER — FLUTICASONE PROPIONATE 50 MCG
2 SPRAY, SUSPENSION (ML) NASAL DAILY
Qty: 16 G | Refills: 3 | Status: SHIPPED | OUTPATIENT
Start: 2025-06-10 | End: 2025-06-12 | Stop reason: SDUPTHER

## 2025-06-10 NOTE — TELEPHONE ENCOUNTER
PT. IS AWARE THE  IS NOT IN ON TUESDAY'S. THE  IS NOT IN TODAY.    Refill Request:    fluticasone (Flonase) 50 mcg/actuation nasal spray Administer 2 sprays into each nostril once daily.     # 90 day supply    PHARMACY:   [facility] - [address] Phone: [phone]   Fax: [phone]        Aisha's # [phone]

## 2025-06-12 RX ORDER — FLUTICASONE PROPIONATE 50 MCG
2 SPRAY, SUSPENSION (ML) NASAL DAILY
Qty: 48 G | Refills: 3 | Status: SHIPPED | OUTPATIENT
Start: 2025-06-12

## 2025-06-12 NOTE — TELEPHONE ENCOUNTER
Pt is calling about the Flonase script, [facility] will only fill a 3 month supply and the order is for a 1 month w/3 refills Please advise and call [phone].

## 2025-06-17 DIAGNOSIS — E11.49 CONTROLLED TYPE 2 DIABETES MELLITUS WITH OTHER NEUROLOGIC COMPLICATION, WITHOUT LONG-TERM CURRENT USE OF INSULIN: ICD-10-CM

## 2025-06-17 DIAGNOSIS — K21.9 GASTROESOPHAGEAL REFLUX DISEASE, UNSPECIFIED WHETHER ESOPHAGITIS PRESENT: ICD-10-CM

## 2025-06-17 RX ORDER — METFORMIN HYDROCHLORIDE 500 MG/1
TABLET, EXTENDED RELEASE ORAL
Qty: 270 TABLET | Refills: 3 | Status: SHIPPED | OUTPATIENT
Start: 2025-06-17

## 2025-06-17 RX ORDER — LANSOPRAZOLE 30 MG/1
30 CAPSULE, DELAYED RELEASE ORAL
Qty: 90 CAPSULE | Refills: 3 | Status: SHIPPED | OUTPATIENT
Start: 2025-06-17

## 2025-06-17 NOTE — TELEPHONE ENCOUNTER
Refill Request:    lansoprazole (Prevacid) 30 mg DR capsule Take 1 capsule (30 mg) by mouth once daily in the morning. Take before meals.     metFORMIN  mg 24 hr tablet Take 2 tablets in the AM, and 1 tablet PM     # 90 day supply    PHARMACY:  [facility] - [address] Phone: [phone]   Fax: [phone]        Aisha's # [phone]

## 2025-06-23 PROCEDURE — RXMED WILLOW AMBULATORY MEDICATION CHARGE

## 2025-06-25 ENCOUNTER — PHARMACY VISIT (OUTPATIENT)
Dept: PHARMACY | Facility: CLINIC | Age: 75
End: 2025-06-25
Payer: COMMERCIAL

## 2025-07-03 ENCOUNTER — APPOINTMENT (OUTPATIENT)
Dept: GASTROENTEROLOGY | Facility: CLINIC | Age: 75
End: 2025-07-03
Payer: MEDICARE

## 2025-07-03 VITALS
OXYGEN SATURATION: 90 % | HEART RATE: 79 BPM | SYSTOLIC BLOOD PRESSURE: 138 MMHG | DIASTOLIC BLOOD PRESSURE: 67 MMHG | HEIGHT: 60 IN | BODY MASS INDEX: 31.61 KG/M2 | WEIGHT: 161 LBS

## 2025-07-03 DIAGNOSIS — K76.0 FATTY LIVER: Primary | ICD-10-CM

## 2025-07-03 DIAGNOSIS — K21.9 GASTROESOPHAGEAL REFLUX DISEASE, UNSPECIFIED WHETHER ESOPHAGITIS PRESENT: ICD-10-CM

## 2025-07-03 DIAGNOSIS — R10.13 EPIGASTRIC PAIN: ICD-10-CM

## 2025-07-03 DIAGNOSIS — R19.5 POSITIVE COLORECTAL CANCER SCREENING USING COLOGUARD TEST: ICD-10-CM

## 2025-07-03 PROCEDURE — 3078F DIAST BP <80 MM HG: CPT | Performed by: INTERNAL MEDICINE

## 2025-07-03 PROCEDURE — 1159F MED LIST DOCD IN RCRD: CPT | Performed by: INTERNAL MEDICINE

## 2025-07-03 PROCEDURE — 99214 OFFICE O/P EST MOD 30 MIN: CPT | Performed by: INTERNAL MEDICINE

## 2025-07-03 PROCEDURE — 3075F SYST BP GE 130 - 139MM HG: CPT | Performed by: INTERNAL MEDICINE

## 2025-07-03 RX ORDER — POLYETHYLENE GLYCOL 3350, SODIUM SULFATE ANHYDROUS, SODIUM BICARBONATE, SODIUM CHLORIDE, POTASSIUM CHLORIDE 236; 22.74; 6.74; 5.86; 2.97 G/4L; G/4L; G/4L; G/4L; G/4L
4000 POWDER, FOR SOLUTION ORAL ONCE
Qty: 4000 ML | Refills: 0 | Status: SHIPPED | OUTPATIENT
Start: 2025-07-03 | End: 2025-07-03

## 2025-07-03 ASSESSMENT — ENCOUNTER SYMPTOMS: ABDOMINAL PAIN: 1

## 2025-07-03 NOTE — PATIENT INSTRUCTIONS
Continue lansoprazole for now.Try to taper off in future  May consider EGD if persistent epigastric pain.  Colonoscopy with MAC.  Continue follow-up with PMD.  Follow-up with GI in 6-months

## 2025-07-03 NOTE — PROGRESS NOTES
Chief Complaint: Aisha Alcantar is a 74 y.o. female who presents for Colonoscopy and Abdominal Pain.    HPI  Patient presents to GI clinic after referral for epigastric pain. Pt had a positive cologuard 12/2024. Today, patient endorses continued epigastric abdominal pain with associated reflux which has been improving with her Lansoprazole. Additionally, she was experiencing vomiting with her metformin, but has been feeling better after reducing her dosage. No record of her most recent EGD was found on file, however she shared that she had a hiatal hernia and a gastric ulcer.     Review of Systems   Gastrointestinal:  Positive for abdominal pain.     12 Point ROS negative outside of symptoms stated above in HPI    Medical History[1]    Surgical History[2]    No relevant family history has been documented for this patient.     reports that she has been smoking cigarettes. She has never used smokeless tobacco. She reports that she does not currently use alcohol. She reports that she does not currently use drugs.    RX Allergies[3]    Imaging  STUDY:  CT ABDOMEN AND PELVIS WITH CONTRAST; 8/7/2018 12:16 pm     INDICATION:  Signs/Symptoms: severe llq abd pain, constiaption then diarrhea,.     COMPARISON:  None.     ACCESSION NUMBER(S):  [id number]     ORDERING CLINICIAN:  PILY ROMERO    [facility] CHEST: No acute airspace disease, pleural or pericardial  effusion. Moderate size hiatal hernia with a short segment of the  proximal stomach above the diaphragm at the midline, not in a type 3  configuration     BONES: No acute skeletal findings.     LIVER: The liver is fatty without sign of cirrhosis. No mass lesion.     SPLEEN: Normal. No enlargement, mass or evidence of splenic vein  thrombosis.     PANCREAS: Normal. No CT evidence of acute or chronic pancreatitis. No  duct dilation. No mass.     GALLBLADDER: Surgically absent.     BILE DUCTS: Normal. No biliary duct dilation.     ADRENAL GLANDS: Normal. No nodule or  mass.     KIDNEYS AND URETERS: Normal except for 1 or 2 subcentimeter too small  to characterize but likely benign low-attenuation lesions on each  side. No hydronephrosis on either side. No mass. Symmetric  enhancement. No infarct or CT evidence of acute pyelonephritis. No  substantial radiodense stone. Tiny stones and radiolucent stones  could be occult on CT.     LYMPH NODES: No adenopathy, intraperitoneal, retroperitoneal, pelvic  or otherwise     APPENDIX: I suspect prior appendectomy. If the appendix is still  present it is rudimentary and not inflamed.     COLON: Normal. No sign of acute diverticulitis or other colitis. No  annular constricting mass.     SMALL BOWEL: Normal. No small bowel dilation or any other sign of  small bowel obstruction. No sign of active inflammatory bowel disease.     STOMACH / DUODENUM: Grossly normal by CT which has limited  sensitivity and specificity for the stomach and duodenum.     RETROPERITONEUM: Normal. No acute hemorrhage or inflammatory change.  Lymph nodes in a separate dedicated section.     OMENTUM, MESENTERY AND PERITONEAL SPACES:  Free intraperitoneal air: Negative  Free intraperitoneal fluid: Negative  Abscess: Negative  Other: n/a. (Lymph nodes in a separate dedicated section.)     URINARY BLADDER: Normal. No wall thickening, large diverticula,  radiodense stone or surrounding inflammatory change.     PELVIS: The uterus is present. There is no suspect adnexal cyst or  mass. No free fluid, mass or adenopathy.     VASCULATURE: Aortic and iliac atherosclerotic calcifications without  aneurysm or other acute finding. No high grade stenosis of the major  abdominal aortic branch vessels. Portal venous system patent.     ABDOMINAL WALL:  Hernia: Tiny fat containing umbilical. No recurrent hernia at the  left lower quadrant or inguinal repair site where there is mesh  Other: No acute or contributory abnormality.    Cardiology, Vascular, and Other Imaging  No other imaging  results found for the past 7 days        Laboratory   Latest Reference Range & Units Most Recent 01/22/24 11:44 02/26/25 11:57   ALKALINE PHOSPHATASE 37 - 153 U/L 59  2/26/25 11:57  59   ALT 7 - 45 U/L 13  1/22/24 11:44 13    ALT 6 - 29 U/L 14  2/26/25 11:57  14   AST 9 - 39 U/L 16  1/22/24 11:44 16    AST 10 - 35 U/L 15  2/26/25 11:57  15   Bilirubin Total 0.0 - 1.2 mg/dL 0.6  1/22/24 11:44 0.6    BILIRUBIN, TOTAL 0.2 - 1.2 mg/dL 0.3  2/26/25 11:57  0.3          Objective     Current Medications[4]    Last Recorded Vitals  Blood pressure 138/67, pulse 79, height (!) 1.524 m (5'), weight 73 kg (161 lb), SpO2 90%.    Physical Exam  HENT:      Mouth/Throat:      Mouth: Mucous membranes are moist.   Eyes:      General: No scleral icterus.  Cardiovascular:      Rate and Rhythm: Normal rate and regular rhythm.   Pulmonary:      Effort: Pulmonary effort is normal.      Breath sounds: No wheezing or rales.   Abdominal:      General: Abdomen is flat. Bowel sounds are normal.      Palpations: Abdomen is soft.      Tenderness: There is abdominal tenderness in the epigastric area.   Skin:     Coloration: Skin is not jaundiced.   Neurological:      Mental Status: She is alert and oriented to person, place, and time.         Assessment/Plan    Cologuard  Mild epigastric pain  GERD.        Continue lansoprazole for now.Try to taper off in future  May consider EGD if persistent epigastric pain.  Colonoscopy with MAC.  Continue follow-up with PMD.  Follow-up with GI in 6-months    Perfecto Ling DO  Internal Medicine PGY-1   I saw and evaluated the patient. I personally obtained the key and critical portions of the history and physical exam or was physically present for key and critical portions performed by the resident. I reviewed the resident's documentation and discussed the patient with the resident. I agree with the resident's medical decision making as documented in the note.          [1]   Past Medical History:  Diagnosis  Date    Abdominal discomfort, generalized 05/04/2023    Abdominal pain, acute, left lower quadrant 05/04/2023    Acute candidiasis of vulva and vagina 10/06/2014    Yeast vaginitis    Claudication 05/04/2023    Coronary artery calcification seen on CAT scan 05/04/2023    Fibromyalgia 11/21/2022    Fibromyalgia    Left hip pain 05/04/2023    Other specified diabetes mellitus without complications     Diabetes mellitus of other type without complication    Personal history of other diseases of the circulatory system     History of hypertension    Personal history of other diseases of the digestive system 10/05/2020    History of gastritis    Personal history of other diseases of the respiratory system     History of chronic obstructive lung disease    Personal history of other specified conditions     History of caries    Personal history of urinary (tract) infections 11/08/2013    History of urinary tract infection    S/P cataract surgery 07/12/2023    Sebaceous cyst 05/04/2023    Trapezius muscle spasm 05/04/2023    Unspecified otitis externa, unspecified ear 06/26/2015    Otitis externa   [2]   Past Surgical History:  Procedure Laterality Date    APPENDECTOMY  05/20/2013    Appendectomy    CHOLECYSTECTOMY  05/20/2013    Cholecystectomy    GALLBLADDER SURGERY  09/02/2015    Gallbladder Surgery    HERNIA REPAIR  09/02/2015    Hernia Repair    OTHER SURGICAL HISTORY  05/11/2020    Oral surgery   [3]   Allergies  Allergen Reactions    Acetaminophen Unknown     Can take regular tylenol but not extra strengh    Cortisone Unknown     worsens whatever symptom she has    Doxycycline GI Upset    Lidocaine Unknown     She states that anesthetic increases her sensitivity to pain    Naproxen Sodium Unknown    Procaine Unknown   [4]   Current Outpatient Medications:     albuterol 90 mcg/actuation inhaler, Inhale 2 puffs every 4 hours if needed for wheezing., Disp: 54 g, Rfl: 3    amLODIPine (Norvasc) 10 mg tablet, TAKE 1 TABLET  DAILY, Disp: 90 tablet, Rfl: 3    aspirin 81 mg chewable tablet, Chew and swallow 1 tablet (81 mg) once daily., Disp: , Rfl:     atorvastatin (Lipitor) 40 mg tablet, TAKE 1 TABLET DAILY AT BEDTIME, Disp: 90 tablet, Rfl: 3    cholecalciferol (Vitamin D-3) 10 MCG (400 UNIT) tablet, Take 1 tablet (400 Units) by mouth once daily., Disp: , Rfl:     fluticasone (Flonase) 50 mcg/actuation nasal spray, Administer 2 sprays into each nostril once daily., Disp: 48 g, Rfl: 3    fluticasone-umeclidin-vilanter (Trelegy Ellipta) 100-62.5-25 mcg blister with device, Inhale 1 puff once daily., Disp: 180 each, Rfl: 3    furosemide (Lasix) 40 mg tablet, Take 1 tablet (40 mg) by mouth once daily., Disp: 90 tablet, Rfl: 3    lansoprazole (Prevacid) 30 mg DR capsule, Take 1 capsule (30 mg) by mouth once daily in the morning. Take before meals., Disp: 90 capsule, Rfl: 3    losartan (Cozaar) 100 mg tablet, TAKE 1 TABLET DAILY, Disp: 90 tablet, Rfl: 3    metFORMIN  mg 24 hr tablet, Take 2 tablets in the AM, and 1 tablet PM, Disp: 270 tablet, Rfl: 3    walker (Ultra-Light Rollator) misc, Rollator - use daily Dx: OA, lumbar spondylosis, Disp: 1 each, Rfl: 0    polyethylene glycol (Golytely) 236-22.74-6.74 -5.86 gram solution, Take 4,000 mL by mouth 1 time for 1 dose., Disp: 4000 mL, Rfl: 0    sucralfate (Carafate) 1 gram tablet, Take 1 tablet (1 g) by mouth 4 times a day before meals. (Patient not taking: Reported on 7/3/2025), Disp: 360 tablet, Rfl: 3

## 2025-07-12 ENCOUNTER — HOSPITAL ENCOUNTER (OUTPATIENT)
Dept: RADIOLOGY | Facility: HOSPITAL | Age: 75
Discharge: HOME | End: 2025-07-12
Payer: MEDICARE

## 2025-07-12 DIAGNOSIS — K76.0 FATTY LIVER: ICD-10-CM

## 2025-07-12 PROCEDURE — 76705 ECHO EXAM OF ABDOMEN: CPT

## 2025-07-12 PROCEDURE — 76705 ECHO EXAM OF ABDOMEN: CPT | Performed by: RADIOLOGY

## 2025-07-16 ENCOUNTER — TELEPHONE (OUTPATIENT)
Dept: PRIMARY CARE | Facility: CLINIC | Age: 75
End: 2025-07-16

## 2025-07-17 ENCOUNTER — TELEPHONE (OUTPATIENT)
Dept: GASTROENTEROLOGY | Facility: CLINIC | Age: 75
End: 2025-07-17
Payer: MEDICARE

## 2025-07-17 DIAGNOSIS — K76.0 FATTY LIVER: Primary | ICD-10-CM

## 2025-07-17 NOTE — TELEPHONE ENCOUNTER
Pt wanted a better explanation of why she would have possible liver cirrhosis. She wanted to know if you reviewed the results yourself or just radiology. Pt said she is not completley against doing the FibroScan but that she wanted more of an explanation. She also is scheduled August 4th for a colon and wanted me to ask if both the colon and Fibroscan need done. I explained to her what a FibroScan was/looks at but she wanted to know more about the findings on the Sonogram and what would have caused them. Please advise or call pt to discuss when available

## 2025-08-04 ENCOUNTER — ANESTHESIA EVENT (OUTPATIENT)
Dept: OPERATING ROOM | Facility: HOSPITAL | Age: 75
End: 2025-08-04
Payer: MEDICARE

## 2025-08-04 ENCOUNTER — ANESTHESIA (OUTPATIENT)
Dept: OPERATING ROOM | Facility: HOSPITAL | Age: 75
End: 2025-08-04
Payer: MEDICARE

## 2025-08-04 ENCOUNTER — HOSPITAL ENCOUNTER (OUTPATIENT)
Dept: OPERATING ROOM | Facility: HOSPITAL | Age: 75
Setting detail: OUTPATIENT SURGERY
Discharge: HOME | End: 2025-08-04
Payer: MEDICARE

## 2025-08-04 ENCOUNTER — TELEPHONE (OUTPATIENT)
Dept: PRIMARY CARE | Facility: CLINIC | Age: 75
End: 2025-08-04

## 2025-08-04 VITALS
DIASTOLIC BLOOD PRESSURE: 56 MMHG | BODY MASS INDEX: 30.77 KG/M2 | WEIGHT: 156.75 LBS | OXYGEN SATURATION: 96 % | SYSTOLIC BLOOD PRESSURE: 120 MMHG | RESPIRATION RATE: 16 BRPM | HEIGHT: 60 IN | HEART RATE: 79 BPM | TEMPERATURE: 97.2 F

## 2025-08-04 DIAGNOSIS — R19.5 POSITIVE COLORECTAL CANCER SCREENING USING COLOGUARD TEST: ICD-10-CM

## 2025-08-04 DIAGNOSIS — E11.49 TYPE 2 DIABETES MELLITUS WITH OTHER NEUROLOGIC COMPLICATION, WITHOUT LONG-TERM CURRENT USE OF INSULIN: ICD-10-CM

## 2025-08-04 LAB — GLUCOSE BLD MANUAL STRIP-MCNC: 151 MG/DL (ref 74–99)

## 2025-08-04 PROCEDURE — 96372 THER/PROPH/DIAG INJ SC/IM: CPT | Performed by: NURSE ANESTHETIST, CERTIFIED REGISTERED

## 2025-08-04 PROCEDURE — 3600000002 HC OR TIME - INITIAL BASE CHARGE - PROCEDURE LEVEL TWO: Performed by: STUDENT IN AN ORGANIZED HEALTH CARE EDUCATION/TRAINING PROGRAM

## 2025-08-04 PROCEDURE — 2500000004 HC RX 250 GENERAL PHARMACY W/ HCPCS (ALT 636 FOR OP/ED): Performed by: NURSE ANESTHETIST, CERTIFIED REGISTERED

## 2025-08-04 PROCEDURE — 99100 ANES PT EXTEME AGE<1 YR&>70: CPT | Performed by: STUDENT IN AN ORGANIZED HEALTH CARE EDUCATION/TRAINING PROGRAM

## 2025-08-04 PROCEDURE — 82947 ASSAY GLUCOSE BLOOD QUANT: CPT

## 2025-08-04 PROCEDURE — 3700000001 HC GENERAL ANESTHESIA TIME - INITIAL BASE CHARGE: Performed by: STUDENT IN AN ORGANIZED HEALTH CARE EDUCATION/TRAINING PROGRAM

## 2025-08-04 PROCEDURE — 3600000007 HC OR TIME - EACH INCREMENTAL 1 MINUTE - PROCEDURE LEVEL TWO: Performed by: STUDENT IN AN ORGANIZED HEALTH CARE EDUCATION/TRAINING PROGRAM

## 2025-08-04 PROCEDURE — A45385 PR COLONOSCOPY,REMV LESN,SNARE: Performed by: NURSE ANESTHETIST, CERTIFIED REGISTERED

## 2025-08-04 PROCEDURE — 7100000010 HC PHASE TWO TIME - EACH INCREMENTAL 1 MINUTE: Performed by: STUDENT IN AN ORGANIZED HEALTH CARE EDUCATION/TRAINING PROGRAM

## 2025-08-04 PROCEDURE — 7100000009 HC PHASE TWO TIME - INITIAL BASE CHARGE: Performed by: STUDENT IN AN ORGANIZED HEALTH CARE EDUCATION/TRAINING PROGRAM

## 2025-08-04 PROCEDURE — 3700000002 HC GENERAL ANESTHESIA TIME - EACH INCREMENTAL 1 MINUTE: Performed by: STUDENT IN AN ORGANIZED HEALTH CARE EDUCATION/TRAINING PROGRAM

## 2025-08-04 PROCEDURE — A45385 PR COLONOSCOPY,REMV LESN,SNARE: Performed by: STUDENT IN AN ORGANIZED HEALTH CARE EDUCATION/TRAINING PROGRAM

## 2025-08-04 PROCEDURE — 45385 COLONOSCOPY W/LESION REMOVAL: CPT | Performed by: INTERNAL MEDICINE

## 2025-08-04 RX ORDER — PROPOFOL 10 MG/ML
INJECTION, EMULSION INTRAVENOUS AS NEEDED
Status: DISCONTINUED | OUTPATIENT
Start: 2025-08-04 | End: 2025-08-04

## 2025-08-04 RX ORDER — SODIUM CHLORIDE, SODIUM LACTATE, POTASSIUM CHLORIDE, CALCIUM CHLORIDE 600; 310; 30; 20 MG/100ML; MG/100ML; MG/100ML; MG/100ML
INJECTION, SOLUTION INTRAVENOUS CONTINUOUS PRN
Status: DISCONTINUED | OUTPATIENT
Start: 2025-08-04 | End: 2025-08-04

## 2025-08-04 RX ORDER — LIDOCAINE HYDROCHLORIDE 10 MG/ML
INJECTION, SOLUTION INFILTRATION; PERINEURAL AS NEEDED
Status: DISCONTINUED | OUTPATIENT
Start: 2025-08-04 | End: 2025-08-04

## 2025-08-04 RX ADMIN — PROPOFOL 50 MG: 10 INJECTION, EMULSION INTRAVENOUS at 08:53

## 2025-08-04 RX ADMIN — GLUCAGON HYDROCHLORIDE 0.5 MG: 1 INJECTION, POWDER, FOR SOLUTION INTRAMUSCULAR; INTRAVENOUS; SUBCUTANEOUS at 08:43

## 2025-08-04 RX ADMIN — PROPOFOL 50 MG: 10 INJECTION, EMULSION INTRAVENOUS at 08:47

## 2025-08-04 RX ADMIN — SODIUM CHLORIDE, SODIUM LACTATE, POTASSIUM CHLORIDE, AND CALCIUM CHLORIDE: .6; .31; .03; .02 INJECTION, SOLUTION INTRAVENOUS at 08:22

## 2025-08-04 RX ADMIN — LIDOCAINE HYDROCHLORIDE 40 MG: 10 INJECTION, SOLUTION INFILTRATION; PERINEURAL at 08:32

## 2025-08-04 RX ADMIN — PROPOFOL 80 MG: 10 INJECTION, EMULSION INTRAVENOUS at 08:32

## 2025-08-04 RX ADMIN — PROPOFOL 30 MG: 10 INJECTION, EMULSION INTRAVENOUS at 08:37

## 2025-08-04 RX ADMIN — PROPOFOL 50 MG: 10 INJECTION, EMULSION INTRAVENOUS at 08:42

## 2025-08-04 SDOH — HEALTH STABILITY: MENTAL HEALTH: CURRENT SMOKER: 0

## 2025-08-04 ASSESSMENT — PAIN SCALES - GENERAL
PAINLEVEL_OUTOF10: 0 - NO PAIN
PAINLEVEL_OUTOF10: 0 - NO PAIN

## 2025-08-04 ASSESSMENT — PAIN - FUNCTIONAL ASSESSMENT
PAIN_FUNCTIONAL_ASSESSMENT: 0-10
PAIN_FUNCTIONAL_ASSESSMENT: 0-10

## 2025-08-04 NOTE — H&P
History Of Present Illness  Aisha Alcantar is a 74 y.o. female presenting to GI lab for diagnostic colonoscopy     Past Medical History  Medical History[1]    Surgical History  Surgical History[2]     Social History  She reports that she has been smoking cigarettes. She has never used smokeless tobacco. She reports that she does not currently use alcohol. She reports that she does not currently use drugs.    Family History  Family History[3]     Allergies  Acetaminophen, Cortisone, Doxycycline, Lidocaine, Naproxen sodium, and Procaine    Review of Systems     Physical Exam    Cardiovascular:      Rate and Rhythm: Normal rate and regular rhythm.   Pulmonary:      Effort: Pulmonary effort is normal. No respiratory distress.     Neurological:      Mental Status: She is alert and oriented to person, place, and time.          Last Recorded Vitals  Blood pressure 173/88, pulse 84, temperature 36.3 °C (97.3 °F), resp. rate 18, height (!) 1.524 m (5'), weight 71.1 kg (156 lb 12 oz), SpO2 95%.    Relevant Results           Assessment & Plan  Positive colorectal cancer screening using Cologuard test          Diagnostic colonoscopy      Iban Manrique MD         [1]   Past Medical History:  Diagnosis Date    Abdominal discomfort, generalized 05/04/2023    Abdominal pain, acute, left lower quadrant 05/04/2023    Aortic stenosis     Claudication 05/04/2023    Coronary artery calcification seen on CAT scan 05/04/2023    Depression with anxiety     Fibromyalgia 11/21/2022    Fibromyalgia    Generalized osteoarthritis     Hiatal hernia     History of caries     History of chronic obstructive lung disease     History of gastritis 10/05/2020    History of hypertension     History of urinary tract infection 11/08/2013    Iron deficiency     Irritable bowel syndrome with diarrhea     Left hip pain 05/04/2023    Lumbar spondylosis     Mixed hyperlipidemia     JAKOB (obstructive sleep apnea)     Other specified diabetes mellitus  without complications     Diabetes mellitus of other type without complication    Right bundle branch block     S/P cataract surgery 07/12/2023    Sebaceous cyst 05/04/2023    Trapezius muscle spasm 05/04/2023    Type 2 diabetes mellitus with neurologic complication     Unspecified otitis externa, unspecified ear 06/26/2015    Otitis externa    Yeast vaginitis 10/06/2014   [2]   Past Surgical History:  Procedure Laterality Date    APPENDECTOMY  05/20/2013    Appendectomy    CHOLECYSTECTOMY  05/20/2013    Cholecystectomy    GALLBLADDER SURGERY  09/02/2015    Gallbladder Surgery    HERNIA REPAIR  09/02/2015    Hernia Repair    MOUTH SURGERY  05/11/2020   [3] No family history on file.

## 2025-08-04 NOTE — ANESTHESIA PREPROCEDURE EVALUATION
Patient: Aisha Alcantar    Procedure Information       Date/Time: 08/04/25 0830    Scheduled providers: Iban Manrique MD; Lasha Wei DO    Procedure: COLONOSCOPY    Location: [facility] OR            Relevant Problems   Anesthesia (within normal limits)      Cardiac   (+) Aortic stenosis   (+) Mixed hyperlipidemia   (+) Primary hypertension   (+) Right bundle branch block (RBBB) on electrocardiography      Pulmonary   (+) Chronic obstructive pulmonary disease (Multi)   (+) Pulmonary nodules      Neuro   (+) Depression with anxiety   (+) Peripheral neuropathy      GI   (+) Hiatal hernia   (+) Irritable bowel syndrome with diarrhea      /Renal   (+) Hyponatremia      Liver (within normal limits)      Endocrine   (+) Type 2 diabetes mellitus with neurologic complication      Hematology   (+) Polycythemia      Musculoskeletal   (+) Fibromyalgia   (+) Generalized osteoarthritis of multiple sites      HEENT (within normal limits)      ID (within normal limits)      Skin   (+) Eczema      GYN (within normal limits)     Vitals:    08/04/25 0749   BP: 173/88   Pulse: 84   Resp: 18   Temp: 36.3 °C (97.3 °F)   SpO2: 95%       Surgical History[1]  Medical History[2]  Current Medications[3]  Prior to Admission medications   Medication Sig Start Date End Date Taking? Authorizing Provider   albuterol 90 mcg/actuation inhaler Inhale 2 puffs every 4 hours if needed for wheezing. 1/15/25  Yes Edgar Sagastume DO   amLODIPine (Norvasc) 10 mg tablet TAKE 1 TABLET DAILY 4/13/25  Yes Edgar Sagastume DO   atorvastatin (Lipitor) 40 mg tablet TAKE 1 TABLET DAILY AT BEDTIME 4/13/25  Yes Edgar Sagastume DO   cholecalciferol (Vitamin D-3) 10 MCG (400 UNIT) tablet Take 1 tablet (400 Units) by mouth once daily.   Yes Historical Provider, MD   fluticasone-umeclidin-vilanter (Trelegy Ellipta) 100-62.5-25 mcg blister with device Inhale 1 puff once daily. 1/15/25 1/10/26 Yes Edgar Sagastume DO   lansoprazole  "(Prevacid) 30 mg DR capsule Take 1 capsule (30 mg) by mouth once daily in the morning. Take before meals. 6/17/25  Yes Edgar Sagastume DO   losartan (Cozaar) 100 mg tablet TAKE 1 TABLET DAILY 12/22/24  Yes Edgar Sagastume DO   aspirin 81 mg chewable tablet Chew and swallow 1 tablet (81 mg) once daily.    Historical Provider, MD   fluticasone (Flonase) 50 mcg/actuation nasal spray Administer 2 sprays into each nostril once daily. 6/12/25   Edgar Sagastume DO   furosemide (Lasix) 40 mg tablet Take 1 tablet (40 mg) by mouth once daily. 3/5/25 3/5/26  Edgar Sagastume DO   metFORMIN  mg 24 hr tablet Take 2 tablets in the AM, and 1 tablet PM 6/17/25   Edgar Sagastume DO   sucralfate (Carafate) 1 gram tablet Take 1 tablet (1 g) by mouth 4 times a day before meals.  Patient not taking: Reported on 7/3/2025 10/14/24   Edgar Sagastume DO   walker (Ultra-Light Rollator) misc Rollator - use daily  Dx: OA, lumbar spondylosis 10/18/23   Edgar Sagastume DO     RX Allergies[4]  Social History     Tobacco Use    Smoking status: Every Day     Current packs/day: 2.00     Types: Cigarettes    Smokeless tobacco: Never   Substance Use Topics    Alcohol use: Not Currently         Chemistry    Lab Results   Component Value Date/Time     02/26/2025 1157    K 4.5 02/26/2025 1157    CL 99 02/26/2025 1157    CO2 30 02/26/2025 1157    BUN 12 02/26/2025 1157    CREATININE 0.78 02/26/2025 1157    Lab Results   Component Value Date/Time    CALCIUM 9.2 02/26/2025 1157    ALKPHOS 59 02/26/2025 1157    AST 15 02/26/2025 1157    ALT 14 02/26/2025 1157    BILITOT 0.3 02/26/2025 1157          Lab Results   Component Value Date/Time    WBC 11.1 (H) 02/26/2025 1157    HGB 16.2 (H) 02/26/2025 1157    HCT 49.8 (H) 02/26/2025 1157     02/26/2025 1157     No results found for: \"PROTIME\", \"PTT\", \"INR\"  No results found for this or any previous visit (from the past 4464 hours).  No results found for this or any previous visit from the " past 1095 days.   Clinical information reviewed:   Tobacco  Allergies  Meds   Med Hx  Surg Hx   Fam Hx  Soc Hx        NPO Detail:  No data recorded     Physical Exam    Airway  Mallampati: II  TM distance: >3 FB  Neck ROM: full  Mouth opening: 3 or more finger widths     Cardiovascular - normal exam   Dental - normal exam     Pulmonary - normal exam   Abdominal (+) obese  Abdomen: soft             Anesthesia Plan    History of general anesthesia?: yes  History of complications of general anesthesia?: no    ASA 3     MAC     The patient is not a current smoker.    intravenous induction   Anesthetic plan and risks discussed with patient.    Plan discussed with CRNA and attending.             [1]   Past Surgical History:  Procedure Laterality Date    APPENDECTOMY  05/20/2013    Appendectomy    CHOLECYSTECTOMY  05/20/2013    Cholecystectomy    GALLBLADDER SURGERY  09/02/2015    Gallbladder Surgery    HERNIA REPAIR  09/02/2015    Hernia Repair    MOUTH SURGERY  05/11/2020   [2]   Past Medical History:  Diagnosis Date    Abdominal discomfort, generalized 05/04/2023    Abdominal pain, acute, left lower quadrant 05/04/2023    Aortic stenosis     Claudication 05/04/2023    Coronary artery calcification seen on CAT scan 05/04/2023    Depression with anxiety     Fibromyalgia 11/21/2022    Fibromyalgia    Generalized osteoarthritis     Hiatal hernia     History of caries     History of chronic obstructive lung disease     History of gastritis 10/05/2020    History of hypertension     History of urinary tract infection 11/08/2013    Iron deficiency     Irritable bowel syndrome with diarrhea     Left hip pain 05/04/2023    Lumbar spondylosis     Mixed hyperlipidemia     JAKOB (obstructive sleep apnea)     Other specified diabetes mellitus without complications     Diabetes mellitus of other type without complication    Right bundle branch block     S/P cataract surgery 07/12/2023    Sebaceous cyst 05/04/2023    Trapezius  muscle spasm 05/04/2023    Type 2 diabetes mellitus with neurologic complication     Unspecified otitis externa, unspecified ear 06/26/2015    Otitis externa    Yeast vaginitis 10/06/2014   [3]   Current Outpatient Medications:     albuterol 90 mcg/actuation inhaler, Inhale 2 puffs every 4 hours if needed for wheezing., Disp: 54 g, Rfl: 3    amLODIPine (Norvasc) 10 mg tablet, TAKE 1 TABLET DAILY, Disp: 90 tablet, Rfl: 3    atorvastatin (Lipitor) 40 mg tablet, TAKE 1 TABLET DAILY AT BEDTIME, Disp: 90 tablet, Rfl: 3    cholecalciferol (Vitamin D-3) 10 MCG (400 UNIT) tablet, Take 1 tablet (400 Units) by mouth once daily., Disp: , Rfl:     fluticasone-umeclidin-vilanter (Trelegy Ellipta) 100-62.5-25 mcg blister with device, Inhale 1 puff once daily., Disp: 180 each, Rfl: 3    lansoprazole (Prevacid) 30 mg DR capsule, Take 1 capsule (30 mg) by mouth once daily in the morning. Take before meals., Disp: 90 capsule, Rfl: 3    losartan (Cozaar) 100 mg tablet, TAKE 1 TABLET DAILY, Disp: 90 tablet, Rfl: 3    aspirin 81 mg chewable tablet, Chew and swallow 1 tablet (81 mg) once daily., Disp: , Rfl:     fluticasone (Flonase) 50 mcg/actuation nasal spray, Administer 2 sprays into each nostril once daily., Disp: 48 g, Rfl: 3    furosemide (Lasix) 40 mg tablet, Take 1 tablet (40 mg) by mouth once daily., Disp: 90 tablet, Rfl: 3    metFORMIN  mg 24 hr tablet, Take 2 tablets in the AM, and 1 tablet PM, Disp: 270 tablet, Rfl: 3    sucralfate (Carafate) 1 gram tablet, Take 1 tablet (1 g) by mouth 4 times a day before meals. (Patient not taking: Reported on 7/3/2025), Disp: 360 tablet, Rfl: 3    walker (Ultra-Light Rollator) misc, Rollator - use daily Dx: OA, lumbar spondylosis, Disp: 1 each, Rfl: 0  [4]   Allergies  Allergen Reactions    Acetaminophen Unknown     Can take regular tylenol but not extra strengh    Cortisone Unknown     worsens whatever symptom she has    Doxycycline GI Upset    Lidocaine Unknown     She states that  anesthetic increases her sensitivity to pain    Naproxen Sodium Unknown    Procaine Unknown

## 2025-08-04 NOTE — DISCHARGE INSTRUCTIONS
Patient Instructions after a Colonoscopy      The anesthetics, sedatives or narcotics which were given to you today will be acting in your body for the next 24 hours, so you might feel a little sleepy or groggy.  This feeling should slowly wear off. Carefully read and follow the instructions.     You received sedation today:  - Do not drive or operate any machinery or power tools of any kind.   - No alcoholic beverages today, not even beer or wine.  - Do not make any important decisions or sign any legal documents.  - No over the counter medications that contain alcohol or that may cause drowsiness.  - Do not make any important decisions or sign any legal documents.  - Make sure you have someone with you for first 24 hours.    While it is common to experience mild to moderate abdominal distention, gas, or belching after your procedure, if any of these symptoms occur following discharge from the GI Lab or within one week of having your procedure, call the [facility] to be advised whether a visit to your nearest Urgent Care or Emergency Department is indicated.  Take this paper with you if you go.     - If you develop an allergic reaction to the medications that were given during your procedure such as difficulty breathing, rash, hives, severe nausea, vomiting or lightheadedness.  - If you experience chest pain, shortness of breath, severe abdominal pain, fevers and chills.  -If you develop signs and symptoms of bleeding such as blood in your spit, if your stools turn black, tarry, or bloody  - If you have not urinated within 8 hours following your procedure.  - If your IV site becomes painful, red, inflamed, or looks infected.    If you received a biopsy/polypectomy/sphincterotomy the following instructions apply below:    __ Do not use Aspirin containing products, non-steroidal medications or anti-coagulants for one week following your procedure. (Examples of these types of medications are: Advil,  Arthrotec, Aleve, Coumadin, Ecotrin, Heparin, Ibuprofen, Indocin, Motrin, Naprosyn, Nuprin, Plavix, Vioxx, and Voltarin, or their generic forms.  This list is not all-inclusive.  Check with your physician or pharmacist before resuming medications.)   __ Eat a soft diet today.  Avoid foods that are poorly digested for the next 24 hours.  These foods would include: nuts, beans, lettuce, red meats, and fried foods. Start with liquids and advance your diet as tolerated, gradually work up to eating solids.   __ Do not have a Barium Study or Enema for one week.    Your physician recommends the additional following instructions:    -You have a contact number available for emergencies. The signs and symptoms of potential delayed complications were discussed with you. You may return to normal activities tomorrow.  -Resume your previous diet.  -Continue your present medications.   -We are waiting for your pathology results.  -Your physician has recommended a repeat colonoscopy (date to be determined after pending pathology results are reviewed) for surveillance based on pathology results.  -The findings and recommendations have been discussed with you.  -The findings and recommendations were discussed with your family.  - Please see Medication Reconciliation Form for new medication/medications prescribed.       If you experience any problems or have any questions following discharge from the GI Lab, please call:        Nurse Signature                                                                        Date___________________                                                                            Patient/Responsible Party Signature                                        Date___________________

## 2025-08-04 NOTE — ANESTHESIA POSTPROCEDURE EVALUATION
Patient: Aisha Alcantar    Procedure Summary       Date: 08/04/25 Room / Location: [facility] OR    Anesthesia Start: 0822 Anesthesia Stop: 0910    Procedure: COLONOSCOPY Diagnosis: Positive colorectal cancer screening using Cologuard test    Scheduled Providers: Iban Manrique MD; Lasha Wei DO Responsible Provider: Lasha Wei DO    Anesthesia Type: MAC ASA Status: 3            Anesthesia Type: MAC    Vitals Value Taken Time   /56 08/04/25 09:15   Temp 36.2 °C (97.2 °F) 08/04/25 09:15   Pulse 79 08/04/25 09:15   Resp 16 08/04/25 09:15   SpO2 96 % 08/04/25 09:15       Anesthesia Post Evaluation    Patient location during evaluation: PACU  Patient participation: complete - patient participated  Level of consciousness: awake  Pain management: adequate  Multimodal analgesia pain management approach  Airway patency: patent  Two or more strategies used to mitigate risk of obstructive sleep apnea  Cardiovascular status: acceptable  Respiratory status: acceptable  Hydration status: acceptable  Postoperative Nausea and Vomiting: none        There were no known notable events for this encounter.

## 2025-08-04 NOTE — TELEPHONE ENCOUNTER
Pt got her labs done and said she had issue with her liver and think it may be the metformin.  And she also is asking if you can add a A1c onto her lab work she is getting?

## 2025-08-11 LAB
LABORATORY COMMENT REPORT: NORMAL
PATH REPORT.FINAL DX SPEC: NORMAL
PATH REPORT.GROSS SPEC: NORMAL
PATH REPORT.RELEVANT HX SPEC: NORMAL
PATH REPORT.TOTAL CANCER: NORMAL

## 2025-08-12 ENCOUNTER — CLINICAL SUPPORT (OUTPATIENT)
Dept: GASTROENTEROLOGY | Facility: CLINIC | Age: 75
End: 2025-08-12
Payer: MEDICARE

## 2025-08-12 DIAGNOSIS — K76.0 FATTY LIVER: ICD-10-CM

## 2025-08-12 LAB
ALBUMIN SERPL-MCNC: 4.3 G/DL (ref 3.6–5.1)
ALBUMIN SERPL-MCNC: 4.4 G/DL (ref 3.6–5.1)
ALP SERPL-CCNC: 60 U/L (ref 37–153)
ALT SERPL-CCNC: 12 U/L (ref 6–29)
ANION GAP SERPL CALCULATED.4IONS-SCNC: 14 MMOL/L (CALC) (ref 7–17)
AST SERPL-CCNC: 13 U/L (ref 10–35)
BILIRUB SERPL-MCNC: 0.2 MG/DL (ref 0.2–1.2)
BUN SERPL-MCNC: 12 MG/DL (ref 7–25)
BUN SERPL-MCNC: 12 MG/DL (ref 7–25)
BUN/CREAT SERPL: ABNORMAL (CALC) (ref 6–22)
CALCIUM SERPL-MCNC: 9 MG/DL (ref 8.6–10.4)
CALCIUM SERPL-MCNC: 9.2 MG/DL (ref 8.6–10.4)
CHLORIDE SERPL-SCNC: 100 MMOL/L (ref 98–110)
CHLORIDE SERPL-SCNC: 99 MMOL/L (ref 98–110)
CO2 SERPL-SCNC: 28 MMOL/L (ref 20–32)
CO2 SERPL-SCNC: 30 MMOL/L (ref 20–32)
CREAT SERPL-MCNC: 0.67 MG/DL (ref 0.6–1)
CREAT SERPL-MCNC: 0.7 MG/DL (ref 0.6–1)
EGFRCR SERPLBLD CKD-EPI 2021: 90 ML/MIN/1.73M2
EGFRCR SERPLBLD CKD-EPI 2021: 91 ML/MIN/1.73M2
EST. AVERAGE GLUCOSE BLD GHB EST-MCNC: 160 MG/DL
EST. AVERAGE GLUCOSE BLD GHB EST-SCNC: 8.9 MMOL/L
GLUCOSE SERPL-MCNC: 129 MG/DL (ref 65–139)
GLUCOSE SERPL-MCNC: 132 MG/DL (ref 65–99)
HBA1C MFR BLD: 7.2 %
MAGNESIUM SERPL-MCNC: 1.7 MG/DL (ref 1.5–2.5)
OSMOLALITY SERPL: 287 MOSM/KG (ref 278–305)
OSMOLALITY UR: 505 MOSM/KG (ref 50–1200)
PHOSPHATE SERPL-MCNC: 4.1 MG/DL (ref 2.1–4.3)
POTASSIUM SERPL-SCNC: 4.4 MMOL/L (ref 3.5–5.3)
POTASSIUM SERPL-SCNC: 4.4 MMOL/L (ref 3.5–5.3)
PROT SERPL-MCNC: 6.7 G/DL (ref 6.1–8.1)
SODIUM SERPL-SCNC: 141 MMOL/L (ref 135–146)
SODIUM SERPL-SCNC: 141 MMOL/L (ref 135–146)

## 2025-08-12 PROCEDURE — 91200 LIVER ELASTOGRAPHY: CPT | Performed by: STUDENT IN AN ORGANIZED HEALTH CARE EDUCATION/TRAINING PROGRAM

## 2025-08-14 LAB
CHLORIDE UR-SCNC: 98 MMOL/L (ref 32–290)
CHLORIDE/CREAT UR-SRTO: 90 MMOL/G CREAT (ref 38–318)
CREAT UR-MCNC: 108 MG/DL (ref 20–275)
POTASSIUM UR-SCNC: 65 MMOL/L (ref 12–129)
POTASSIUM/CREAT UR-SRTO: 59 MMOL/G CREAT (ref 17–121)
SODIUM UR-SCNC: 53 MMOL/L (ref 28–272)
SODIUM/CREAT UR-SRTO: 49 MMOL/G CREAT (ref 28–280)

## 2025-08-18 ENCOUNTER — APPOINTMENT (OUTPATIENT)
Dept: NEPHROLOGY | Facility: CLINIC | Age: 75
End: 2025-08-18
Payer: MEDICARE

## 2025-08-18 VITALS
OXYGEN SATURATION: 97 % | WEIGHT: 157.2 LBS | TEMPERATURE: 96.9 F | HEART RATE: 76 BPM | HEIGHT: 60 IN | DIASTOLIC BLOOD PRESSURE: 78 MMHG | BODY MASS INDEX: 30.86 KG/M2 | SYSTOLIC BLOOD PRESSURE: 145 MMHG

## 2025-08-18 DIAGNOSIS — E87.1 HYPONATREMIA: Primary | ICD-10-CM

## 2025-08-18 DIAGNOSIS — E83.42 HYPOMAGNESEMIA: ICD-10-CM

## 2025-08-18 DIAGNOSIS — I10 PRIMARY HYPERTENSION: ICD-10-CM

## 2025-08-18 DIAGNOSIS — M79.89 LEG SWELLING: ICD-10-CM

## 2025-08-18 PROCEDURE — 3077F SYST BP >= 140 MM HG: CPT | Performed by: INTERNAL MEDICINE

## 2025-08-18 PROCEDURE — G2211 COMPLEX E/M VISIT ADD ON: HCPCS | Performed by: INTERNAL MEDICINE

## 2025-08-18 PROCEDURE — 1157F ADVNC CARE PLAN IN RCRD: CPT | Performed by: INTERNAL MEDICINE

## 2025-08-18 PROCEDURE — 3078F DIAST BP <80 MM HG: CPT | Performed by: INTERNAL MEDICINE

## 2025-08-18 PROCEDURE — 99213 OFFICE O/P EST LOW 20 MIN: CPT | Performed by: INTERNAL MEDICINE

## 2025-08-18 PROCEDURE — 1159F MED LIST DOCD IN RCRD: CPT | Performed by: INTERNAL MEDICINE

## 2025-09-01 ENCOUNTER — RESULTS FOLLOW-UP (OUTPATIENT)
Dept: GASTROENTEROLOGY | Facility: CLINIC | Age: 75
End: 2025-09-01
Payer: MEDICARE

## 2025-09-03 ENCOUNTER — RESULTS FOLLOW-UP (OUTPATIENT)
Dept: GASTROENTEROLOGY | Facility: CLINIC | Age: 75
End: 2025-09-03
Payer: MEDICARE

## 2025-10-16 ENCOUNTER — APPOINTMENT (OUTPATIENT)
Dept: GASTROENTEROLOGY | Facility: CLINIC | Age: 75
End: 2025-10-16
Payer: MEDICARE

## 2026-03-30 ENCOUNTER — APPOINTMENT (OUTPATIENT)
Dept: NEPHROLOGY | Facility: CLINIC | Age: 76
End: 2026-03-30
Payer: MEDICARE

## (undated) DEVICE — COVER,LIGHT HANDLE,FLX,1/PK: Brand: MEDLINE INDUSTRIES, INC.

## (undated) DEVICE — SET MAXILLOFAC STTEMAXFAC

## (undated) DEVICE — MAGNETIC DRAPE: Brand: DEVON

## (undated) DEVICE — TUBING, SUCTION, 1/4" X 10', STRAIGHT: Brand: MEDLINE

## (undated) DEVICE — MARKER,SKIN,WI/RULER AND LABELS: Brand: MEDLINE

## (undated) DEVICE — KIT 1 TBL CVR 50X90 REINF 1 MAYO STD CVR 24X53 REINF 1 SUT

## (undated) DEVICE — SET SURG BASIN MAYO REUSABLE

## (undated) DEVICE — YANKAUER,BULB TIP,W/O VENT,RIGID,STERILE: Brand: MEDLINE

## (undated) DEVICE — NEEDLE HYPO 25GA L1.5IN BLU POLYPR HUB S STL REG BVL STR

## (undated) DEVICE — SYRINGE 20ML LL S/C 50

## (undated) DEVICE — GAUZE,SPONGE,4"X4",16PLY,XRAY,STRL,LF: Brand: MEDLINE

## (undated) DEVICE — TOWEL,OR,DSP,ST,BLUE,STD,6/PK,12PK/CS: Brand: MEDLINE

## (undated) DEVICE — DOUBLE BASIN SET: Brand: MEDLINE INDUSTRIES, INC.

## (undated) DEVICE — GLOVE ORANGE PI 8   MSG9080

## (undated) DEVICE — SYRINGE MED 10ML TRNSLUC BRL PLUNG BLK MRK POLYPR CTRL

## (undated) DEVICE — NDL CNTR 40CT FM MAG: Brand: MEDLINE INDUSTRIES, INC.

## (undated) DEVICE — GOWN,SIRUS,NONRNF,SETINSLV,XL,20/CS: Brand: MEDLINE

## (undated) DEVICE — INTENDED FOR TISSUE SEPARATION, AND OTHER PROCEDURES THAT REQUIRE A SHARP SURGICAL BLADE TO PUNCTURE OR CUT.: Brand: BARD-PARKER ® STAINLESS STEEL BLADES

## (undated) DEVICE — CATHETER IV 14 GAX2 IN WNG INTROCAN SAFETY